# Patient Record
Sex: FEMALE | Race: WHITE | NOT HISPANIC OR LATINO | Employment: FULL TIME | URBAN - NONMETROPOLITAN AREA
[De-identification: names, ages, dates, MRNs, and addresses within clinical notes are randomized per-mention and may not be internally consistent; named-entity substitution may affect disease eponyms.]

---

## 2023-03-25 DIAGNOSIS — I10 ESSENTIAL (PRIMARY) HYPERTENSION: ICD-10-CM

## 2023-03-27 RX ORDER — DULOXETIN HYDROCHLORIDE 30 MG/1
1 CAPSULE, DELAYED RELEASE ORAL DAILY
COMMUNITY
Start: 2023-03-12

## 2023-03-27 RX ORDER — EPINEPHRINE 0.3 MG/.3ML
INJECTION SUBCUTANEOUS
COMMUNITY

## 2023-03-27 RX ORDER — LOSARTAN POTASSIUM 25 MG/1
1 TABLET ORAL DAILY
COMMUNITY
Start: 2023-03-02 | End: 2023-04-04 | Stop reason: ENTERED-IN-ERROR

## 2023-03-27 RX ORDER — ALBUTEROL SULFATE 90 UG/1
AEROSOL, METERED RESPIRATORY (INHALATION)
COMMUNITY

## 2023-03-27 RX ORDER — LOSARTAN POTASSIUM 25 MG/1
TABLET ORAL
Qty: 30 TABLET | Refills: 3 | Status: SHIPPED | OUTPATIENT
Start: 2023-03-27 | End: 2023-04-04 | Stop reason: SDUPTHER

## 2023-04-04 ENCOUNTER — OFFICE VISIT (OUTPATIENT)
Dept: PRIMARY CARE | Facility: CLINIC | Age: 48
End: 2023-04-04
Payer: COMMERCIAL

## 2023-04-04 VITALS
HEIGHT: 63 IN | WEIGHT: 199 LBS | SYSTOLIC BLOOD PRESSURE: 137 MMHG | HEART RATE: 98 BPM | BODY MASS INDEX: 35.26 KG/M2 | DIASTOLIC BLOOD PRESSURE: 97 MMHG

## 2023-04-04 DIAGNOSIS — E66.09 CLASS 2 OBESITY DUE TO EXCESS CALORIES WITHOUT SERIOUS COMORBIDITY WITH BODY MASS INDEX (BMI) OF 36.0 TO 36.9 IN ADULT: Primary | ICD-10-CM

## 2023-04-04 DIAGNOSIS — I10 ESSENTIAL (PRIMARY) HYPERTENSION: ICD-10-CM

## 2023-04-04 PROBLEM — R63.5 WEIGHT GAIN: Status: ACTIVE | Noted: 2023-04-04

## 2023-04-04 PROBLEM — E66.9 OBESITY: Status: ACTIVE | Noted: 2023-04-04

## 2023-04-04 PROBLEM — Z04.9 CONDITION NOT FOUND: Status: ACTIVE | Noted: 2023-04-04

## 2023-04-04 PROBLEM — E66.812 CLASS 2 OBESITY WITH BODY MASS INDEX (BMI) OF 36.0 TO 36.9 IN ADULT: Status: ACTIVE | Noted: 2023-04-04

## 2023-04-04 PROBLEM — E66.9 CLASS 2 OBESITY WITH BODY MASS INDEX (BMI) OF 36.0 TO 36.9 IN ADULT: Status: ACTIVE | Noted: 2023-04-04

## 2023-04-04 PROBLEM — M25.551 RIGHT HIP PAIN: Status: ACTIVE | Noted: 2023-04-04

## 2023-04-04 PROBLEM — R26.2 DIFFICULTY WALKING: Status: ACTIVE | Noted: 2023-04-04

## 2023-04-04 PROBLEM — G43.119 MIGRAINE WITH AURA, INTRACTABLE: Status: ACTIVE | Noted: 2023-04-04

## 2023-04-04 PROCEDURE — 99214 OFFICE O/P EST MOD 30 MIN: CPT | Performed by: REGISTERED NURSE

## 2023-04-04 PROCEDURE — 3075F SYST BP GE 130 - 139MM HG: CPT | Performed by: REGISTERED NURSE

## 2023-04-04 PROCEDURE — 1036F TOBACCO NON-USER: CPT | Performed by: REGISTERED NURSE

## 2023-04-04 PROCEDURE — 3080F DIAST BP >= 90 MM HG: CPT | Performed by: REGISTERED NURSE

## 2023-04-04 PROCEDURE — 3008F BODY MASS INDEX DOCD: CPT | Performed by: REGISTERED NURSE

## 2023-04-04 RX ORDER — LOSARTAN POTASSIUM 50 MG/1
50 TABLET ORAL DAILY
Qty: 30 TABLET | Refills: 1 | Status: SHIPPED | OUTPATIENT
Start: 2023-04-04 | End: 2023-04-26

## 2023-04-04 RX ORDER — PHENTERMINE HYDROCHLORIDE 37.5 MG/1
37.5 CAPSULE ORAL DAILY
COMMUNITY
End: 2023-05-10 | Stop reason: SINTOL

## 2023-04-04 ASSESSMENT — ENCOUNTER SYMPTOMS
SHORTNESS OF BREATH: 0
NAUSEA: 0
FEVER: 0
CONSTIPATION: 0
DIZZINESS: 0
ABDOMINAL PAIN: 0
CHILLS: 0
DIARRHEA: 0
CONFUSION: 0
NERVOUS/ANXIOUS: 0
DIFFICULTY URINATING: 0
COUGH: 0
HEADACHES: 0
WEAKNESS: 0
WOUND: 0
EYE REDNESS: 0
EYE DISCHARGE: 0
RHINORRHEA: 0
WHEEZING: 0
VOMITING: 0
FREQUENCY: 0

## 2023-04-26 DIAGNOSIS — I10 ESSENTIAL (PRIMARY) HYPERTENSION: ICD-10-CM

## 2023-04-26 PROBLEM — L50.9 HIVES: Status: ACTIVE | Noted: 2019-11-07

## 2023-04-26 PROBLEM — R06.02 SOB (SHORTNESS OF BREATH): Status: RESOLVED | Noted: 2019-11-07 | Resolved: 2023-04-26

## 2023-04-26 PROBLEM — Z91.010 PEANUT ALLERGY: Status: ACTIVE | Noted: 2019-11-21

## 2023-04-26 PROBLEM — R10.12 LUQ PAIN: Status: RESOLVED | Noted: 2018-08-23 | Resolved: 2023-04-26

## 2023-04-26 PROBLEM — T78.3XXA ANGIOEDEMA: Status: ACTIVE | Noted: 2019-11-07

## 2023-04-26 PROBLEM — R22.1 THROAT SWELLING: Status: RESOLVED | Noted: 2019-11-07 | Resolved: 2023-04-26

## 2023-04-26 PROBLEM — J30.89 ALLERGIC RHINITIS DUE TO DUST: Status: ACTIVE | Noted: 2019-11-21

## 2023-04-26 PROBLEM — Z96.651 S/P TOTAL KNEE ARTHROPLASTY, RIGHT: Status: ACTIVE | Noted: 2021-08-06

## 2023-04-26 PROBLEM — R13.10 DYSPHAGIA: Status: ACTIVE | Noted: 2018-08-23

## 2023-04-26 PROBLEM — R14.0 BLOATING: Status: ACTIVE | Noted: 2018-08-23

## 2023-04-26 PROBLEM — K57.30 DIVERTICULOSIS OF LARGE INTESTINE WITHOUT HEMORRHAGE: Status: RESOLVED | Noted: 2018-08-23 | Resolved: 2023-04-26

## 2023-04-26 PROBLEM — M25.561 ACUTE PAIN OF RIGHT KNEE: Status: RESOLVED | Noted: 2021-06-08 | Resolved: 2023-04-26

## 2023-04-26 PROBLEM — R06.83 SNORING: Status: ACTIVE | Noted: 2019-11-07

## 2023-04-26 PROBLEM — R19.4 CHANGE IN BOWEL HABITS: Status: ACTIVE | Noted: 2018-08-23

## 2023-04-26 PROBLEM — J30.1 SEASONAL ALLERGIC RHINITIS DUE TO POLLEN: Status: ACTIVE | Noted: 2019-11-21

## 2023-04-26 PROBLEM — J45.30 MILD PERSISTENT ASTHMA WITHOUT COMPLICATION (HHS-HCC): Status: ACTIVE | Noted: 2020-04-09

## 2023-04-26 PROBLEM — Z91.018 FOOD ALLERGY: Status: ACTIVE | Noted: 2019-11-07

## 2023-04-26 PROBLEM — K22.10 EROSIVE ESOPHAGITIS: Status: ACTIVE | Noted: 2018-08-23

## 2023-04-26 PROBLEM — K21.9 GERD (GASTROESOPHAGEAL REFLUX DISEASE): Status: ACTIVE | Noted: 2020-04-09

## 2023-04-26 PROBLEM — R12 PYROSIS: Status: ACTIVE | Noted: 2018-08-23

## 2023-04-26 PROBLEM — Z91.013 SHELLFISH ALLERGY: Status: ACTIVE | Noted: 2019-11-21

## 2023-04-26 RX ORDER — LOSARTAN POTASSIUM 50 MG/1
TABLET ORAL
Qty: 30 TABLET | Refills: 1 | Status: SHIPPED | OUTPATIENT
Start: 2023-04-26 | End: 2023-05-10 | Stop reason: SDUPTHER

## 2023-05-09 NOTE — PROGRESS NOTES
Subjective   Patient ID: Ese Edouard is a 47 y.o. female who presents for Follow-up (Pt presents for 1 month for wt loss; ozempic seems to be going okay, vomiting at times tho; losartan was increased last visit, unsure if its working out well or not, bp runs a big high).    HPI     Started phentermine last month. Feels like she is having opposite effects, feeling more hungry and tired. She was at 204 for starting weight. Today she is 199lbs. She is craving sugar. She is also on cymbalta. She changed her diet completely, quite drinking diet coke and drinking more water and eating fruit and veggies.   Feels stimulated for a couple hours and she then feels tied. Coffee does not do much for her.     Flank pain: 2 weeks ago, she ate dinner rice went to cardio drumming and threw up on the way home. Last night she had rice and drumming again and threw up. Urine is cloudy, does not feel like she has the flu. Having flank pain for a bout a week. Denies urgency. The pain is bad. Throbbing burning pain. She is taking ibuprofen for the pain. Constant flank pain radiating to her left side.      HTN: Her bp is still elevated,  Was started on losartan and increased last visit, 144/92.     Review of Systems   Constitutional:  Negative for chills and fever.   HENT:  Negative for congestion, ear pain and rhinorrhea.    Eyes:  Negative for discharge and redness.   Respiratory:  Negative for cough, shortness of breath and wheezing.    Cardiovascular:  Negative for chest pain and leg swelling.   Gastrointestinal:  Negative for abdominal pain, constipation, diarrhea, nausea and vomiting.   Genitourinary:  Negative for difficulty urinating, frequency and urgency.   Musculoskeletal:  Negative for gait problem.   Skin:  Negative for rash and wound.   Neurological:  Negative for dizziness, weakness and headaches.   Psychiatric/Behavioral:  Negative for confusion. The patient is not nervous/anxious.        Objective   BP (!) 141/94 (BP  "Location: Left arm, Patient Position: Sitting, BP Cuff Size: Large adult)   Pulse 80   Ht 1.575 m (5' 2\")   Wt 88.8 kg (195 lb 12.8 oz)   BMI 35.81 kg/m²     Physical Exam  Vitals reviewed.   Constitutional:       Appearance: Normal appearance.   HENT:      Head: Normocephalic.      Right Ear: Tympanic membrane, ear canal and external ear normal.      Left Ear: Tympanic membrane, ear canal and external ear normal.      Nose: No rhinorrhea.      Mouth/Throat:      Mouth: Mucous membranes are moist.      Pharynx: Oropharynx is clear.   Eyes:      Pupils: Pupils are equal, round, and reactive to light.   Cardiovascular:      Rate and Rhythm: Normal rate and regular rhythm.      Pulses: Normal pulses.   Pulmonary:      Effort: Pulmonary effort is normal.      Breath sounds: Normal breath sounds.   Abdominal:      General: Abdomen is flat. Bowel sounds are normal.      Palpations: Abdomen is soft.   Musculoskeletal:         General: No tenderness. Normal range of motion.      Right lower leg: No edema.      Left lower leg: No edema.   Lymphadenopathy:      Cervical: No cervical adenopathy.   Skin:     General: Skin is warm and dry.      Findings: No rash.   Neurological:      Mental Status: She is alert and oriented to person, place, and time.   Psychiatric:         Mood and Affect: Mood normal.         Behavior: Behavior normal.       Assessment/Plan       #Flank pain   Concerning for kidney stone   CT abdomen pelvis ordered   Cmp, uric acid   Also having great toe pain   Will call with results       #Difficulty with weight loss  Discussed wellbutrin, phentermine, and other meds  Had SE's on topiramate  Labs show some insulin resisitance  CSA signed today  Trial phentermine   Did not like phentermine   Trial Ozempic   May be a candidate for tulicity or ozempic if SE's on ozempic      #Htn  Increasing losartan today   "

## 2023-05-10 ENCOUNTER — LAB (OUTPATIENT)
Dept: LAB | Facility: LAB | Age: 48
End: 2023-05-10
Payer: COMMERCIAL

## 2023-05-10 ENCOUNTER — APPOINTMENT (OUTPATIENT)
Dept: PRIMARY CARE | Facility: CLINIC | Age: 48
End: 2023-05-10
Payer: COMMERCIAL

## 2023-05-10 ENCOUNTER — OFFICE VISIT (OUTPATIENT)
Dept: PRIMARY CARE | Facility: CLINIC | Age: 48
End: 2023-05-10
Payer: COMMERCIAL

## 2023-05-10 VITALS
SYSTOLIC BLOOD PRESSURE: 141 MMHG | HEART RATE: 80 BPM | BODY MASS INDEX: 36.03 KG/M2 | HEIGHT: 62 IN | DIASTOLIC BLOOD PRESSURE: 94 MMHG | WEIGHT: 195.8 LBS

## 2023-05-10 DIAGNOSIS — M79.676 PAIN OF GREAT TOE, UNSPECIFIED LATERALITY: ICD-10-CM

## 2023-05-10 DIAGNOSIS — R10.9 ACUTE LEFT FLANK PAIN: ICD-10-CM

## 2023-05-10 DIAGNOSIS — I10 ESSENTIAL (PRIMARY) HYPERTENSION: ICD-10-CM

## 2023-05-10 DIAGNOSIS — E66.09 CLASS 2 OBESITY DUE TO EXCESS CALORIES WITHOUT SERIOUS COMORBIDITY WITH BODY MASS INDEX (BMI) OF 36.0 TO 36.9 IN ADULT: Primary | ICD-10-CM

## 2023-05-10 LAB
ALANINE AMINOTRANSFERASE (SGPT) (U/L) IN SER/PLAS: 23 U/L (ref 7–45)
ALBUMIN (G/DL) IN SER/PLAS: 4.5 G/DL (ref 3.4–5)
ALKALINE PHOSPHATASE (U/L) IN SER/PLAS: 59 U/L (ref 33–110)
ANION GAP IN SER/PLAS: 12 MMOL/L (ref 10–20)
ASPARTATE AMINOTRANSFERASE (SGOT) (U/L) IN SER/PLAS: 24 U/L (ref 9–39)
BILIRUBIN TOTAL (MG/DL) IN SER/PLAS: 0.7 MG/DL (ref 0–1.2)
CALCIUM (MG/DL) IN SER/PLAS: 9.8 MG/DL (ref 8.6–10.3)
CARBON DIOXIDE, TOTAL (MMOL/L) IN SER/PLAS: 30 MMOL/L (ref 21–32)
CHLORIDE (MMOL/L) IN SER/PLAS: 103 MMOL/L (ref 98–107)
CREATININE (MG/DL) IN SER/PLAS: 0.81 MG/DL (ref 0.5–1.05)
GFR FEMALE: 89 ML/MIN/1.73M2
GLUCOSE (MG/DL) IN SER/PLAS: 89 MG/DL (ref 74–99)
POTASSIUM (MMOL/L) IN SER/PLAS: 3.8 MMOL/L (ref 3.5–5.3)
PROTEIN TOTAL: 7.1 G/DL (ref 6.4–8.2)
SODIUM (MMOL/L) IN SER/PLAS: 141 MMOL/L (ref 136–145)
URATE (MG/DL) IN SER/PLAS: 5.5 MG/DL (ref 2.3–6.7)
UREA NITROGEN (MG/DL) IN SER/PLAS: 8 MG/DL (ref 6–23)

## 2023-05-10 PROCEDURE — 3008F BODY MASS INDEX DOCD: CPT | Performed by: REGISTERED NURSE

## 2023-05-10 PROCEDURE — 84550 ASSAY OF BLOOD/URIC ACID: CPT

## 2023-05-10 PROCEDURE — 1036F TOBACCO NON-USER: CPT | Performed by: REGISTERED NURSE

## 2023-05-10 PROCEDURE — 3077F SYST BP >= 140 MM HG: CPT | Performed by: REGISTERED NURSE

## 2023-05-10 PROCEDURE — 80053 COMPREHEN METABOLIC PANEL: CPT

## 2023-05-10 PROCEDURE — 36415 COLL VENOUS BLD VENIPUNCTURE: CPT

## 2023-05-10 PROCEDURE — 99214 OFFICE O/P EST MOD 30 MIN: CPT | Performed by: REGISTERED NURSE

## 2023-05-10 PROCEDURE — 3080F DIAST BP >= 90 MM HG: CPT | Performed by: REGISTERED NURSE

## 2023-05-10 RX ORDER — LOSARTAN POTASSIUM 100 MG/1
100 TABLET ORAL DAILY
Qty: 30 TABLET | Refills: 1 | Status: SHIPPED | OUTPATIENT
Start: 2023-05-10 | End: 2023-06-02

## 2023-05-10 RX ORDER — LOSARTAN POTASSIUM 50 MG/1
50 TABLET ORAL DAILY
Qty: 90 TABLET | Refills: 3 | Status: SHIPPED | OUTPATIENT
Start: 2023-05-10 | End: 2023-07-28

## 2023-05-10 ASSESSMENT — ENCOUNTER SYMPTOMS
FEVER: 0
NERVOUS/ANXIOUS: 0
HEADACHES: 0
SHORTNESS OF BREATH: 0
COUGH: 0
CONSTIPATION: 0
CHILLS: 0
DIARRHEA: 0
WOUND: 0
FREQUENCY: 0
DIFFICULTY URINATING: 0
RHINORRHEA: 0
WEAKNESS: 0
ABDOMINAL PAIN: 0
WHEEZING: 0
NAUSEA: 0
CONFUSION: 0
VOMITING: 0
EYE DISCHARGE: 0
EYE REDNESS: 0
DIZZINESS: 0

## 2023-05-11 DIAGNOSIS — R07.81 RIB PAIN ON LEFT SIDE: Primary | ICD-10-CM

## 2023-05-11 RX ORDER — METHYLPREDNISOLONE 4 MG/1
TABLET ORAL
Qty: 21 TABLET | Refills: 0 | Status: SHIPPED | OUTPATIENT
Start: 2023-05-11 | End: 2023-05-18

## 2023-05-11 RX ORDER — CYCLOBENZAPRINE HCL 5 MG
5 TABLET ORAL 3 TIMES DAILY PRN
Qty: 30 TABLET | Refills: 0 | Status: SHIPPED | OUTPATIENT
Start: 2023-05-11 | End: 2023-07-10

## 2023-06-02 DIAGNOSIS — I10 ESSENTIAL (PRIMARY) HYPERTENSION: ICD-10-CM

## 2023-06-02 RX ORDER — LOSARTAN POTASSIUM 100 MG/1
TABLET ORAL
Qty: 30 TABLET | Refills: 1 | Status: SHIPPED | OUTPATIENT
Start: 2023-06-02 | End: 2023-06-27

## 2023-06-06 NOTE — PROGRESS NOTES
#Difficulty with weight loss  Discussed wellbutrin, phentermine, and other meds  Had SE's on topiramate  Labs show some insulin resisitance  CSA signed today  Trial phentermine   Did not like phentermine   Trial Ozempic   May be a candidate for trulicity or ozempic if SE's on ozempic     Crystal is here for an ozempic follow up. Her previous weight was   Wt Readings from Last 1 Encounters:   05/10/23 88.8 kg (195 lb 12.8 oz)

## 2023-06-09 ENCOUNTER — APPOINTMENT (OUTPATIENT)
Dept: PRIMARY CARE | Facility: CLINIC | Age: 48
End: 2023-06-09
Payer: COMMERCIAL

## 2023-06-26 DIAGNOSIS — I10 ESSENTIAL (PRIMARY) HYPERTENSION: ICD-10-CM

## 2023-06-27 RX ORDER — LOSARTAN POTASSIUM 100 MG/1
TABLET ORAL
Qty: 30 TABLET | Refills: 1 | Status: SHIPPED | OUTPATIENT
Start: 2023-06-27 | End: 2023-07-11

## 2023-07-03 DIAGNOSIS — E66.09 CLASS 2 OBESITY DUE TO EXCESS CALORIES WITHOUT SERIOUS COMORBIDITY WITH BODY MASS INDEX (BMI) OF 36.0 TO 36.9 IN ADULT: ICD-10-CM

## 2023-07-11 DIAGNOSIS — I10 ESSENTIAL (PRIMARY) HYPERTENSION: ICD-10-CM

## 2023-07-11 RX ORDER — LOSARTAN POTASSIUM 100 MG/1
TABLET ORAL
Qty: 90 TABLET | Refills: 1 | Status: SHIPPED | OUTPATIENT
Start: 2023-07-11 | End: 2024-01-09 | Stop reason: ALTCHOICE

## 2023-07-27 NOTE — PROGRESS NOTES
Subjective   Patient ID: Ese Edouard is a 47 y.o. female who presents for Follow-up (1 month; would like to discuss increasing the semaglutide; losartan was increased last visit, no SE, all good;).    HPI   Started phentermine last month. Feels like she is having opposite effects, feeling more hungry and tired. She was at 204 for starting weight. Today she is 199lbs. She is craving sugar. She is also on cymbalta. She changed her diet completely, quite drinking diet coke and drinking more water and eating fruit and veggies.   Feels stimulated for a couple hours and she then feels tied. Coffee does not do much for her.     Ran out of ozempic, missed her last appt. She does not feel it working as well.      Flank pain: 2 weeks ago, she ate dinner rice went to cardio drumming and threw up on the way home. Last night she had rice and drumming again and threw up. Urine is cloudy, does not feel like she has the flu. Having flank pain for a bout a week. Denies urgency. The pain is bad. Throbbing burning pain. She is taking ibuprofen for the pain. Constant flank pain radiating to her left side.      HTN: Her bp is still elevated,  Was started on losartan and increased last visit, 144/92.   She has been checking it at home and it has been fine.        Review of Systems   Constitutional:  Negative for chills and fever.   HENT:  Negative for congestion, ear pain and rhinorrhea.    Eyes:  Negative for discharge and redness.   Respiratory:  Negative for cough, shortness of breath and wheezing.    Cardiovascular:  Negative for chest pain and leg swelling.   Gastrointestinal:  Negative for abdominal pain, constipation, diarrhea, nausea and vomiting.   Genitourinary:  Negative for difficulty urinating, frequency and urgency.   Musculoskeletal:  Negative for gait problem.   Skin:  Negative for rash and wound.   Neurological:  Negative for dizziness, weakness and headaches.   Psychiatric/Behavioral:  Negative for confusion. The  "patient is not nervous/anxious.        Objective   BP (!) 138/109 (BP Location: Right arm, Patient Position: Sitting, BP Cuff Size: Large adult)   Pulse 66   Ht 1.6 m (5' 3\")   Wt 88.5 kg (195 lb 3.2 oz)   SpO2 96%   BMI 34.58 kg/m²     Physical Exam  Vitals reviewed.   Constitutional:       Appearance: Normal appearance.   HENT:      Head: Normocephalic.      Right Ear: Tympanic membrane, ear canal and external ear normal.      Left Ear: Tympanic membrane, ear canal and external ear normal.      Nose: No rhinorrhea.      Mouth/Throat:      Mouth: Mucous membranes are moist.      Pharynx: Oropharynx is clear.   Eyes:      Pupils: Pupils are equal, round, and reactive to light.   Cardiovascular:      Rate and Rhythm: Normal rate and regular rhythm.      Pulses: Normal pulses.   Pulmonary:      Effort: Pulmonary effort is normal.      Breath sounds: Normal breath sounds.   Abdominal:      General: Abdomen is flat. Bowel sounds are normal.      Palpations: Abdomen is soft.   Musculoskeletal:         General: No tenderness. Normal range of motion.      Right lower leg: No edema.      Left lower leg: No edema.   Lymphadenopathy:      Cervical: No cervical adenopathy.   Skin:     General: Skin is warm and dry.      Findings: No rash.   Neurological:      Mental Status: She is alert and oriented to person, place, and time.   Psychiatric:         Mood and Affect: Mood normal.         Behavior: Behavior normal.       Assessment/Plan          #Difficulty with weight loss  Discussed wellbutrin, phentermine, and other meds  Had SE's on topiramate  Labs show some insulin resisitance  Did not like phentermine   Trial Ozempic   Doing well   Increasing dose today      #Htn  Continue losartan   "

## 2023-07-28 ENCOUNTER — OFFICE VISIT (OUTPATIENT)
Dept: PRIMARY CARE | Facility: CLINIC | Age: 48
End: 2023-07-28
Payer: COMMERCIAL

## 2023-07-28 VITALS
HEART RATE: 66 BPM | BODY MASS INDEX: 34.59 KG/M2 | SYSTOLIC BLOOD PRESSURE: 138 MMHG | WEIGHT: 195.2 LBS | HEIGHT: 63 IN | DIASTOLIC BLOOD PRESSURE: 109 MMHG | OXYGEN SATURATION: 96 %

## 2023-07-28 DIAGNOSIS — E66.09 CLASS 2 OBESITY DUE TO EXCESS CALORIES WITHOUT SERIOUS COMORBIDITY WITH BODY MASS INDEX (BMI) OF 36.0 TO 36.9 IN ADULT: ICD-10-CM

## 2023-07-28 DIAGNOSIS — I10 PRIMARY HYPERTENSION: Primary | ICD-10-CM

## 2023-07-28 PROCEDURE — 3080F DIAST BP >= 90 MM HG: CPT | Performed by: REGISTERED NURSE

## 2023-07-28 PROCEDURE — 3008F BODY MASS INDEX DOCD: CPT | Performed by: REGISTERED NURSE

## 2023-07-28 PROCEDURE — 1036F TOBACCO NON-USER: CPT | Performed by: REGISTERED NURSE

## 2023-07-28 PROCEDURE — 3075F SYST BP GE 130 - 139MM HG: CPT | Performed by: REGISTERED NURSE

## 2023-07-28 PROCEDURE — 99213 OFFICE O/P EST LOW 20 MIN: CPT | Performed by: REGISTERED NURSE

## 2023-07-28 ASSESSMENT — ENCOUNTER SYMPTOMS
EYE DISCHARGE: 0
RHINORRHEA: 0
NERVOUS/ANXIOUS: 0
DIFFICULTY URINATING: 0
WOUND: 0
VOMITING: 0
CHILLS: 0
COUGH: 0
EYE REDNESS: 0
WHEEZING: 0
HEADACHES: 0
FREQUENCY: 0
NAUSEA: 0
DIZZINESS: 0
ABDOMINAL PAIN: 0
CONSTIPATION: 0
WEAKNESS: 0
DIARRHEA: 0
CONFUSION: 0
FEVER: 0
SHORTNESS OF BREATH: 0

## 2023-07-28 NOTE — PATIENT INSTRUCTIONS
Week 1 through week 4 : 0.25 mg once weekly.    Week 5 through week 8: 0.5 mg once weekly.    Week 9 through week 12: 1 mg once weekly.    Week 13 through week 16: 1.7 mg once weekly.    Week 17 and thereafter (maintenance dosage): 2.4 mg once weekly; if not tolerated, may temporarily decrease dosage to 1.7 mg once weekly for up to 4 additional weeks, then increase to 2.4 mg once weekly.

## 2023-09-01 DIAGNOSIS — E66.09 CLASS 2 OBESITY DUE TO EXCESS CALORIES WITHOUT SERIOUS COMORBIDITY WITH BODY MASS INDEX (BMI) OF 36.0 TO 36.9 IN ADULT: ICD-10-CM

## 2023-10-19 NOTE — PROGRESS NOTES
"Ese Edouard is a 47 y.o. female who presents for Follow-up (3 months; feeling very weak, heart palpitations, chest pain for about a week; declining flu shot)    Palpitations: She worked day shift for a week and she was very tired, almost falling asleep/nodding off when she sat down. Feeling very weak. Chest pains and palpitations that come and go. Heart is racing when she gets up and walks around. Chest pain she describes as a discomfort/squeezing. Mild shortness of breath. No nausea. One brief episode of heartburn. No fevers or chills. Minimal cough, no sore throat or rhinorrhea,. She is having \"adrenaline rushes\" that she feels in her wrists and neck.    Obesity: SE's on phentermine and topiramate. She is currently taking ozempic. No SE's, feels it is helping.     Nerve pain: On cymbalta.     HTN: On losartan, no SE's but BP is about the same as without medication.     Objective   BP (!) 148/96   Pulse 79   Ht 1.6 m (5' 3\")   Wt 84.8 kg (187 lb)   BMI 33.13 kg/m²     Gen: No acute distress, alert and oriented x3, pleasant   HEENT: moist mucous membranes, b/l external auditory canals are clear of debris, TMs within normal limits, no oropharyngeal lesions, eomi, perrla   Neck: thyroid within normal limits, no lymphadenopathy   CV: RRR, normal S1/S2, no murmur   Resp: Clear to auscultation bilaterally, no wheezes or rhonchi appreciated  Abd: soft, nontender, non-distended, no guarding/rigidity, bowel sounds present  Extr: no edema, no calf tenderness  Derm: Skin is warm and dry, no rashes appreciated  Psych: mood is good, affect is congruent, good hygiene, normal speech and eye contact  Neuro: cranial nerves grossly intact, normal gait    Assessment/Plan     #Palpitations:  Check labs, EKG, CXR  Ordering zio patch  Considering dropping down on ozempic dosage  Followup 1 mo    #Obesity  #Insulin resistance  Discussed wellbutrin, phentermine, and other meds  Had SE's on topiramate and phentermine  Labs show " some insulin resisitance  Doing well on ozempic    #Nerve pain  Reasonable control on cymbalta is wearing off  after finishing phentermine we may increase the dose     #Severe allergies  rx sent epi pen     #HTN  Reasonable control on losartan     HCM:  [facility]  Last period was April of 2022

## 2023-10-27 ENCOUNTER — HOSPITAL ENCOUNTER (OUTPATIENT)
Dept: CARDIOLOGY | Facility: HOSPITAL | Age: 48
Discharge: HOME | End: 2023-10-27
Payer: COMMERCIAL

## 2023-10-27 ENCOUNTER — HOSPITAL ENCOUNTER (OUTPATIENT)
Dept: RADIOLOGY | Facility: HOSPITAL | Age: 48
Discharge: HOME | End: 2023-10-27
Payer: COMMERCIAL

## 2023-10-27 ENCOUNTER — LAB (OUTPATIENT)
Dept: LAB | Facility: LAB | Age: 48
End: 2023-10-27
Payer: COMMERCIAL

## 2023-10-27 ENCOUNTER — OFFICE VISIT (OUTPATIENT)
Dept: PRIMARY CARE | Facility: CLINIC | Age: 48
End: 2023-10-27
Payer: COMMERCIAL

## 2023-10-27 VITALS
SYSTOLIC BLOOD PRESSURE: 148 MMHG | HEIGHT: 63 IN | WEIGHT: 187 LBS | HEART RATE: 79 BPM | BODY MASS INDEX: 33.13 KG/M2 | DIASTOLIC BLOOD PRESSURE: 96 MMHG

## 2023-10-27 DIAGNOSIS — R00.2 PALPITATIONS: ICD-10-CM

## 2023-10-27 DIAGNOSIS — Z13.220 SCREENING FOR HYPERLIPIDEMIA: ICD-10-CM

## 2023-10-27 DIAGNOSIS — E88.819 INSULIN RESISTANCE: ICD-10-CM

## 2023-10-27 DIAGNOSIS — E88.819 INSULIN RESISTANCE: Primary | ICD-10-CM

## 2023-10-27 LAB
ALBUMIN SERPL BCP-MCNC: 4.7 G/DL (ref 3.4–5)
ALP SERPL-CCNC: 57 U/L (ref 33–110)
ALT SERPL W P-5'-P-CCNC: 19 U/L (ref 7–45)
ANION GAP SERPL CALC-SCNC: 12 MMOL/L (ref 10–20)
APPEARANCE UR: CLEAR
AST SERPL W P-5'-P-CCNC: 16 U/L (ref 9–39)
BASOPHILS # BLD AUTO: 0.04 X10*3/UL (ref 0–0.1)
BASOPHILS NFR BLD AUTO: 0.5 %
BILIRUB SERPL-MCNC: 0.7 MG/DL (ref 0–1.2)
BILIRUB UR STRIP.AUTO-MCNC: NEGATIVE MG/DL
BUN SERPL-MCNC: 13 MG/DL (ref 6–23)
CALCIUM SERPL-MCNC: 9.4 MG/DL (ref 8.6–10.3)
CARDIAC TROPONIN I PNL SERPL HS: <3 NG/L (ref 0–13)
CHLORIDE SERPL-SCNC: 105 MMOL/L (ref 98–107)
CHOLEST SERPL-MCNC: 171 MG/DL (ref 0–199)
CHOLESTEROL/HDL RATIO: 2.9
CO2 SERPL-SCNC: 29 MMOL/L (ref 21–32)
COLOR UR: YELLOW
CREAT SERPL-MCNC: 0.7 MG/DL (ref 0.5–1.05)
EOSINOPHIL # BLD AUTO: 0.16 X10*3/UL (ref 0–0.7)
EOSINOPHIL NFR BLD AUTO: 2.1 %
ERYTHROCYTE [DISTWIDTH] IN BLOOD BY AUTOMATED COUNT: 12.5 % (ref 11.5–14.5)
GFR SERPL CREATININE-BSD FRML MDRD: >90 ML/MIN/1.73M*2
GLUCOSE SERPL-MCNC: 78 MG/DL (ref 74–99)
GLUCOSE UR STRIP.AUTO-MCNC: NEGATIVE MG/DL
HCT VFR BLD AUTO: 41.9 % (ref 36–46)
HDLC SERPL-MCNC: 58.9 MG/DL
HGB BLD-MCNC: 14.3 G/DL (ref 12–16)
IMM GRANULOCYTES # BLD AUTO: 0.02 X10*3/UL (ref 0–0.7)
IMM GRANULOCYTES NFR BLD AUTO: 0.3 % (ref 0–0.9)
KETONES UR STRIP.AUTO-MCNC: NEGATIVE MG/DL
LDLC SERPL CALC-MCNC: 93 MG/DL
LEUKOCYTE ESTERASE UR QL STRIP.AUTO: NEGATIVE
LYMPHOCYTES # BLD AUTO: 1.84 X10*3/UL (ref 1.2–4.8)
LYMPHOCYTES NFR BLD AUTO: 24.1 %
MCH RBC QN AUTO: 29.2 PG (ref 26–34)
MCHC RBC AUTO-ENTMCNC: 34.1 G/DL (ref 32–36)
MCV RBC AUTO: 86 FL (ref 80–100)
MONOCYTES # BLD AUTO: 0.58 X10*3/UL (ref 0.1–1)
MONOCYTES NFR BLD AUTO: 7.6 %
NEUTROPHILS # BLD AUTO: 4.98 X10*3/UL (ref 1.2–7.7)
NEUTROPHILS NFR BLD AUTO: 65.4 %
NITRITE UR QL STRIP.AUTO: NEGATIVE
NON HDL CHOLESTEROL: 112 MG/DL (ref 0–149)
NRBC BLD-RTO: 0 /100 WBCS (ref 0–0)
PH UR STRIP.AUTO: 6 [PH]
PLATELET # BLD AUTO: 236 X10*3/UL (ref 150–450)
PMV BLD AUTO: 10.3 FL (ref 7.5–11.5)
POTASSIUM SERPL-SCNC: 4 MMOL/L (ref 3.5–5.3)
PROT SERPL-MCNC: 7.1 G/DL (ref 6.4–8.2)
PROT UR STRIP.AUTO-MCNC: NEGATIVE MG/DL
RBC # BLD AUTO: 4.89 X10*6/UL (ref 4–5.2)
RBC # UR STRIP.AUTO: NEGATIVE /UL
SODIUM SERPL-SCNC: 142 MMOL/L (ref 136–145)
SP GR UR STRIP.AUTO: 1.02
TRIGL SERPL-MCNC: 95 MG/DL (ref 0–149)
TSH SERPL-ACNC: 1 MIU/L (ref 0.44–3.98)
UROBILINOGEN UR STRIP.AUTO-MCNC: <2 MG/DL
VLDL: 19 MG/DL (ref 0–40)
WBC # BLD AUTO: 7.6 X10*3/UL (ref 4.4–11.3)

## 2023-10-27 PROCEDURE — 80061 LIPID PANEL: CPT

## 2023-10-27 PROCEDURE — 36415 COLL VENOUS BLD VENIPUNCTURE: CPT

## 2023-10-27 PROCEDURE — 3080F DIAST BP >= 90 MM HG: CPT | Performed by: FAMILY MEDICINE

## 2023-10-27 PROCEDURE — 87086 URINE CULTURE/COLONY COUNT: CPT

## 2023-10-27 PROCEDURE — 82607 VITAMIN B-12: CPT

## 2023-10-27 PROCEDURE — 93010 ELECTROCARDIOGRAM REPORT: CPT | Performed by: INTERNAL MEDICINE

## 2023-10-27 PROCEDURE — 84484 ASSAY OF TROPONIN QUANT: CPT

## 2023-10-27 PROCEDURE — 93005 ELECTROCARDIOGRAM TRACING: CPT | Mod: 59

## 2023-10-27 PROCEDURE — 93246 EXT ECG>7D<15D RECORDING: CPT

## 2023-10-27 PROCEDURE — 71046 X-RAY EXAM CHEST 2 VIEWS: CPT | Performed by: RADIOLOGY

## 2023-10-27 PROCEDURE — 1036F TOBACCO NON-USER: CPT | Performed by: FAMILY MEDICINE

## 2023-10-27 PROCEDURE — 71046 X-RAY EXAM CHEST 2 VIEWS: CPT | Mod: FY

## 2023-10-27 PROCEDURE — 85025 COMPLETE CBC W/AUTO DIFF WBC: CPT

## 2023-10-27 PROCEDURE — 99214 OFFICE O/P EST MOD 30 MIN: CPT | Performed by: FAMILY MEDICINE

## 2023-10-27 PROCEDURE — 3008F BODY MASS INDEX DOCD: CPT | Performed by: FAMILY MEDICINE

## 2023-10-27 PROCEDURE — 3077F SYST BP >= 140 MM HG: CPT | Performed by: FAMILY MEDICINE

## 2023-10-27 PROCEDURE — 84443 ASSAY THYROID STIM HORMONE: CPT

## 2023-10-27 PROCEDURE — 83036 HEMOGLOBIN GLYCOSYLATED A1C: CPT

## 2023-10-27 PROCEDURE — 80053 COMPREHEN METABOLIC PANEL: CPT

## 2023-10-27 PROCEDURE — 81003 URINALYSIS AUTO W/O SCOPE: CPT

## 2023-10-28 LAB
EST. AVERAGE GLUCOSE BLD GHB EST-MCNC: 97 MG/DL
HBA1C MFR BLD: 5 %
VIT B12 SERPL-MCNC: 415 PG/ML (ref 211–911)

## 2023-10-29 LAB — BACTERIA UR CULT: NORMAL

## 2023-11-02 LAB
ATRIAL RATE: 72 BPM
P AXIS: 59 DEGREES
P OFFSET: 201 MS
P ONSET: 144 MS
PR INTERVAL: 160 MS
Q ONSET: 224 MS
QRS COUNT: 12 BEATS
QRS DURATION: 86 MS
QT INTERVAL: 392 MS
QTC CALCULATION(BAZETT): 429 MS
QTC FREDERICIA: 416 MS
R AXIS: 52 DEGREES
T AXIS: 16 DEGREES
T OFFSET: 420 MS
VENTRICULAR RATE: 72 BPM

## 2023-11-21 PROBLEM — E66.812 CLASS 2 OBESITY WITH BODY MASS INDEX (BMI) OF 36.0 TO 36.9 IN ADULT: Status: RESOLVED | Noted: 2023-04-04 | Resolved: 2023-11-21

## 2023-11-21 PROBLEM — R19.4 CHANGE IN BOWEL HABITS: Status: RESOLVED | Noted: 2018-08-23 | Resolved: 2023-11-21

## 2023-11-21 PROBLEM — M25.551 RIGHT HIP PAIN: Status: RESOLVED | Noted: 2023-04-04 | Resolved: 2023-11-21

## 2023-11-21 PROBLEM — Z91.018 FOOD ALLERGY: Status: RESOLVED | Noted: 2019-11-07 | Resolved: 2023-11-21

## 2023-11-21 PROBLEM — Z96.651 S/P TOTAL KNEE ARTHROPLASTY, RIGHT: Status: RESOLVED | Noted: 2021-08-06 | Resolved: 2023-11-21

## 2023-11-21 PROBLEM — R13.10 DYSPHAGIA: Status: RESOLVED | Noted: 2018-08-23 | Resolved: 2023-11-21

## 2023-11-21 PROBLEM — Z04.9 CONDITION NOT FOUND: Status: RESOLVED | Noted: 2023-04-04 | Resolved: 2023-11-21

## 2023-11-21 PROBLEM — E66.9 CLASS 2 OBESITY WITH BODY MASS INDEX (BMI) OF 36.0 TO 36.9 IN ADULT: Status: RESOLVED | Noted: 2023-04-04 | Resolved: 2023-11-21

## 2023-11-21 PROBLEM — R26.2 DIFFICULTY WALKING: Status: RESOLVED | Noted: 2023-04-04 | Resolved: 2023-11-21

## 2023-11-21 PROBLEM — J30.89 ALLERGIC RHINITIS DUE TO DUST: Status: RESOLVED | Noted: 2019-11-21 | Resolved: 2023-11-21

## 2023-11-21 PROBLEM — J30.1 SEASONAL ALLERGIC RHINITIS DUE TO POLLEN: Status: RESOLVED | Noted: 2019-11-21 | Resolved: 2023-11-21

## 2023-11-21 PROBLEM — Z91.013 SHELLFISH ALLERGY: Status: RESOLVED | Noted: 2019-11-21 | Resolved: 2023-11-21

## 2023-11-21 PROBLEM — Z91.010 PEANUT ALLERGY: Status: RESOLVED | Noted: 2019-11-21 | Resolved: 2023-11-21

## 2023-11-21 PROBLEM — T78.3XXA ANGIOEDEMA: Status: RESOLVED | Noted: 2019-11-07 | Resolved: 2023-11-21

## 2023-11-21 PROBLEM — R14.0 BLOATING: Status: RESOLVED | Noted: 2018-08-23 | Resolved: 2023-11-21

## 2023-11-21 PROBLEM — R12 PYROSIS: Status: RESOLVED | Noted: 2018-08-23 | Resolved: 2023-11-21

## 2023-11-21 PROBLEM — L50.9 HIVES: Status: RESOLVED | Noted: 2019-11-07 | Resolved: 2023-11-21

## 2023-12-08 LAB — BODY SURFACE AREA: 1.94 M2

## 2023-12-08 PROCEDURE — 93248 EXT ECG>7D<15D REV&INTERPJ: CPT | Performed by: INTERNAL MEDICINE

## 2023-12-13 DIAGNOSIS — E66.09 CLASS 2 OBESITY DUE TO EXCESS CALORIES WITHOUT SERIOUS COMORBIDITY WITH BODY MASS INDEX (BMI) OF 36.0 TO 36.9 IN ADULT: ICD-10-CM

## 2023-12-13 RX ORDER — SEMAGLUTIDE 0.68 MG/ML
0.25 INJECTION, SOLUTION SUBCUTANEOUS
Qty: 3 ML | Refills: 1 | Status: SHIPPED | OUTPATIENT
Start: 2023-12-13 | End: 2024-01-09 | Stop reason: ALTCHOICE

## 2023-12-18 ENCOUNTER — TELEPHONE (OUTPATIENT)
Dept: PRIMARY CARE | Facility: CLINIC | Age: 48
End: 2023-12-18
Payer: COMMERCIAL

## 2023-12-18 NOTE — TELEPHONE ENCOUNTER
Resume previous meds and check her blood pressure for 2 weeks and then drop off readings. Thank you.

## 2023-12-18 NOTE — TELEPHONE ENCOUNTER
Pt states she was in ED over weekend for high BP. Today it was 176/118. She just got back on her BP meds today after not taking them for 3 weeks. Should she come in. Or what would you like her to do?

## 2023-12-28 ENCOUNTER — TELEPHONE (OUTPATIENT)
Dept: PRIMARY CARE | Facility: CLINIC | Age: 48
End: 2023-12-28
Payer: COMMERCIAL

## 2023-12-28 NOTE — TELEPHONE ENCOUNTER
This patient no showed her last appointment and also called recently about her blood pressure being dangerously high, and then was recommended to resume previous meds and drop off 2 weeks of blood pressure readings. Give her a 6 week followup, tell her to stay on her meds consistently, and again bring in the readings to that appointment. I believe the fatigue will be gone by then if she lets her body gets used to the medication but if not I will make a change at that visit.

## 2023-12-28 NOTE — TELEPHONE ENCOUNTER
Pt states the BP medication she is taking is causing her to fall asleep all the time and just be tired all the time. What should she do?

## 2024-01-05 NOTE — PROGRESS NOTES
Nursing Note:  - BP prob    Ese Edouard is a 48 y.o. female who presents for No chief complaint on file.     Obesity: SE's on phentermine and topiramate. She is currently taking ozempic. No SE's, feels it is helping. Insurance stopped covering.      Nerve pain: On cymbalta.     HTN, palpitations: Had leg swelling on amlodipine. Had fatigue on metoprolol and losartan, couldn't function. Was having palpitations that resolved when she ran out of losartan as well. Some HA when she has elevated BP readings. Completed cardiac testing. Wants to see a cardiologist.     Objective   There were no vitals taken for this visit.    Gen: No acute distress, alert and oriented x3, pleasant   HEENT: moist mucous membranes, b/l external auditory canals are clear of debris, TMs within normal limits, no oropharyngeal lesions, eomi, perrla   Neck: thyroid within normal limits, no lymphadenopathy   CV: RRR, normal S1/S2, no murmur   Resp: Clear to auscultation bilaterally, no wheezes or rhonchi appreciated  Abd: soft, nontender, non-distended, no guarding/rigidity, bowel sounds present  Extr: no edema, no calf tenderness  Derm: Skin is warm and dry, no rashes appreciated  Psych: mood is good, affect is congruent, good hygiene, normal speech and eye contact  Neuro: cranial nerves grossly intact, normal gait    Assessment/Plan     #Palpitations:  Labs, EKG, CXR, and zio patch were normal     #Obesity  #Insulin resistance  Discussed wellbutrin, phentermine, and other meds  Had SE's on topiramate and phentermine  Labs show some insulin resisitance  Doing well on ozempic     #Nerve pain  Reasonable control on cymbalta is wearing off  after finishing phentermine we may increase the dose     #Severe allergies  rx sent epi pen     #HTN  SE's on amlodipine, hydrochlorothiazide, and losartan  Trial carvedilol  Referring to cardiology today     HCM:  Following with [facility]  Last period was April of 2022

## 2024-01-09 ENCOUNTER — OFFICE VISIT (OUTPATIENT)
Dept: PRIMARY CARE | Facility: CLINIC | Age: 49
End: 2024-01-09
Payer: COMMERCIAL

## 2024-01-09 DIAGNOSIS — I10 PRIMARY HYPERTENSION: Primary | ICD-10-CM

## 2024-01-09 PROCEDURE — 3008F BODY MASS INDEX DOCD: CPT | Performed by: FAMILY MEDICINE

## 2024-01-09 PROCEDURE — 99214 OFFICE O/P EST MOD 30 MIN: CPT | Performed by: FAMILY MEDICINE

## 2024-01-09 PROCEDURE — 1036F TOBACCO NON-USER: CPT | Performed by: FAMILY MEDICINE

## 2024-01-09 RX ORDER — CARVEDILOL 6.25 MG/1
6.25 TABLET ORAL
Qty: 60 TABLET | Refills: 11 | Status: SHIPPED | OUTPATIENT
Start: 2024-01-09 | End: 2024-04-15 | Stop reason: SDUPTHER

## 2024-01-22 ENCOUNTER — APPOINTMENT (OUTPATIENT)
Dept: LAB | Facility: LAB | Age: 49
End: 2024-01-22
Payer: COMMERCIAL

## 2024-01-22 ENCOUNTER — OFFICE VISIT (OUTPATIENT)
Dept: CARDIOLOGY | Facility: CLINIC | Age: 49
End: 2024-01-22
Payer: COMMERCIAL

## 2024-01-22 VITALS
DIASTOLIC BLOOD PRESSURE: 115 MMHG | BODY MASS INDEX: 36.32 KG/M2 | WEIGHT: 205.03 LBS | SYSTOLIC BLOOD PRESSURE: 188 MMHG | HEART RATE: 88 BPM | OXYGEN SATURATION: 97 %

## 2024-01-22 DIAGNOSIS — I10 PRIMARY HYPERTENSION: ICD-10-CM

## 2024-01-22 DIAGNOSIS — R29.818 SUSPECTED SLEEP APNEA: Primary | ICD-10-CM

## 2024-01-22 DIAGNOSIS — E66.01 CLASS 2 SEVERE OBESITY DUE TO EXCESS CALORIES WITH SERIOUS COMORBIDITY AND BODY MASS INDEX (BMI) OF 36.0 TO 36.9 IN ADULT (MULTI): ICD-10-CM

## 2024-01-22 DIAGNOSIS — I10 UNCONTROLLED HYPERTENSION: ICD-10-CM

## 2024-01-22 PROCEDURE — 3080F DIAST BP >= 90 MM HG: CPT | Performed by: NURSE PRACTITIONER

## 2024-01-22 PROCEDURE — 3077F SYST BP >= 140 MM HG: CPT | Performed by: NURSE PRACTITIONER

## 2024-01-22 PROCEDURE — 99204 OFFICE O/P NEW MOD 45 MIN: CPT | Performed by: NURSE PRACTITIONER

## 2024-01-22 PROCEDURE — 1036F TOBACCO NON-USER: CPT | Performed by: NURSE PRACTITIONER

## 2024-01-22 PROCEDURE — 3008F BODY MASS INDEX DOCD: CPT | Performed by: NURSE PRACTITIONER

## 2024-01-22 RX ORDER — LISINOPRIL 5 MG/1
5 TABLET ORAL DAILY
Qty: 30 TABLET | Refills: 11 | Status: SHIPPED | OUTPATIENT
Start: 2024-01-22 | End: 2024-04-15 | Stop reason: SDUPTHER

## 2024-01-22 NOTE — LETTER
January 22, 2024     Diana Foreman DO  [address]  [place] [address]    Patient: Ese Edouard   YOB: 1975   Date of Visit: 1/22/2024       Dear Dr. Diana Foreman DO:    Thank you for referring Ese Edouard to me for evaluation. Below are my notes for this consultation.  If you have questions, please do not hesitate to call me. I look forward to following your patient along with you.       Sincerely,     Silvia Avila, APRN-CNP      CC: No Recipients  ______________________________________________________________________________________    Primary Care Physician: Diana Foreman DO  Primary Cardiologist:      Date of Visit: 01/22/2024  2:20 PM EST  Location of visit:  [facility]   Type of Visit: New Patient        Chief Complaint   Patient presents with   • New Patient Visit     F/U ER visit Dec 2023  C/o palpitations and uncontrolled HTN.   Review Monitor       HPI / Summary:   Ese Edouard is a 48 y.o. female who presents to establish cardiac care    Past medical history significant for HTN, obesity (BMI 36)      She has been diagnosed with HTN for a couple years. Hx mitral valve prolapse age 19   Possible TIA 10 years ago but not sure   Dyspneic on exercise, admits she is deconditioned   Palpitations started in December 2023  Hydrochlorothiazide made her feel weak   Side effects on amlodipine = swelling   Losartan non-compliant then felt better with fewer palpitations, switched to carvedilol 2 weeks ago   An extended Holter did not correlate palpitations with tachycardia or arrhythmia     She has a lot of Daytime sleepiness    at [employer] working rotating shift      12 system review is negative except as noted above      FH: grandfather hx AF  SOC: denies tobacco, ETOH or drugs   3 normal pregnancies, through menopause             Medical History:   Past Medical History:   Diagnosis Date   • Abnormal ECG    • Acute pain of right knee 06/08/2021   • Asthma    •  Contusion of left forearm 09/16/2015   • Diverticulosis of large intestine without hemorrhage 08/23/2018   • Hypertension    • LUQ pain 08/23/2018   • Other intervertebral disc displacement, lumbar region 09/14/2016    Lumbar disc herniation   • Personal history of other diseases of the circulatory system 04/16/2014    History of hypertension   • Personal history of other diseases of the musculoskeletal system and connective tissue 04/16/2014    Personal history of fibromyalgia   • SOB (shortness of breath) 11/07/2019   • Throat swelling 11/07/2019       Surgical History:   Past Surgical History:   Procedure Laterality Date   • BACK SURGERY  09/14/2016    Lower Back Surgery   • ELBOW SURGERY  09/14/2016    Elbow Surgery   • MR HEAD ANGIO WO IV CONTRAST  1/1/2014    MR HEAD ANGIO WO IV CONTRAST 1/1/2014 [facility]INICAL LEGAC[facility]   • MR NECK ANGIO WO IV CONTRAST  1/1/2014    MR NECK ANGIO WO IV CONTRAST 1/1/2014 [facility] CLINICAL LEGACY       Family History:   Family History   Problem Relation Name Age of Onset   • Migraines Mother Jose G Gayle    • Other (hypertension, benign) Mother Jose G Gayle    • Diabetes type II Mother Jose G Gayle    • Diabetes Father Tatyana Edouard    • Diabetes type II Father Tatyana Edouard    • Atrial fibrillation Maternal Grandfather Bernard Clay    • Asthma Maternal Grandmother Audrey Clay    • Thyroid disease Maternal Grandmother Audrey Clay        Social History:   Tobacco Use: Low Risk  (1/22/2024)    Patient History    • Smoking Tobacco Use: Never    • Smokeless Tobacco Use: Never    • Passive Exposure: Never             MEDICATIONS:   Current Outpatient Medications   Medication Instructions   • albuterol 90 mcg/actuation inhaler inhale 1 to 2 puffs by mouth every 4 to 6 hours as needed   • carvedilol (COREG) 6.25 mg, oral, 2 times daily with meals   • DULoxetine (Cymbalta) 30 mg DR capsule 1 capsule, oral, Daily   • EPINEPHrine 0.3 mg/0.3 mL injection syringe USE AS DIRECTED FOR  "BEE STING   • lisinopril 5 mg, oral, Daily         IMAGING REVIEWED:   Holter monitor full report reviewed     LABS:  CBC:   Lab Results   Component Value Date    WBC 7.6 10/27/2023    RBC 4.89 10/27/2023    HGB 14.3 10/27/2023    HCT 41.9 10/27/2023    MCV 86 10/27/2023    MCH 29.2 10/27/2023    MCHC 34.1 10/27/2023    RDW 12.5 10/27/2023     10/27/2023    MPV 10.3 10/27/2023     CBC with Differential:    Lab Results   Component Value Date    WBC 7.6 10/27/2023    RBC 4.89 10/27/2023    HGB 14.3 10/27/2023    HCT 41.9 10/27/2023     10/27/2023    MCV 86 10/27/2023    MCH 29.2 10/27/2023    MCHC 34.1 10/27/2023    RDW 12.5 10/27/2023    NRBC 0.0 10/27/2023    LYMPHOPCT 24.1 10/27/2023    MONOPCT 7.6 10/27/2023    EOSPCT 2.1 10/27/2023    BASOPCT 0.5 10/27/2023    MONOSABS 0.58 10/27/2023    LYMPHSABS 1.84 10/27/2023    EOSABS 0.16 10/27/2023    BASOSABS 0.04 10/27/2023     CMP:    Lab Results   Component Value Date     10/27/2023    K 4.0 10/27/2023     10/27/2023    CO2 29 10/27/2023    BUN 13 10/27/2023    CREATININE 0.70 10/27/2023    GLUCOSE 78 10/27/2023    PROT 7.1 10/27/2023    CALCIUM 9.4 10/27/2023    BILITOT 0.7 10/27/2023    ALKPHOS 57 10/27/2023    AST 16 10/27/2023    ALT 19 10/27/2023     BMP:    Lab Results   Component Value Date     10/27/2023    K 4.0 10/27/2023     10/27/2023    CO2 29 10/27/2023    BUN 13 10/27/2023    CREATININE 0.70 10/27/2023    CALCIUM 9.4 10/27/2023    GLUCOSE 78 10/27/2023     Magnesium:No results found for: \"MG\"  Troponin:    Lab Results   Component Value Date    TROPHS <3 10/27/2023     BNP: No results found for: \"BNP\"    Lipid Panel:  Lab Results   Component Value Date    HDL 58.9 10/27/2023    CHHDL 2.9 10/27/2023    VLDL 19 10/27/2023    TRIG 95 10/27/2023    NHDL 112 10/27/2023        Lab work and imaging results independently reviewed by me         Visit Vitals  BP (!) 188/115   Pulse 88   Wt 93 kg (205 lb 0.4 oz)   SpO2 97%   BMI " 36.32 kg/m²   Smoking Status Never   BSA 2.03 m²          ECG dated 10/27/2026 independently reviewed   NSR       Constitutional:       Appearance: Healthy appearance. Not in distress.   Eyes:      Conjunctiva/sclera: Conjunctivae normal.   Neck:      Vascular: JVD normal.   Pulmonary:      Effort: Pulmonary effort is normal.      Breath sounds: Normal breath sounds.   Cardiovascular:      PMI at left midclavicular line. Normal rate. Regular rhythm. Normal S1. Normal S2.       Murmurs: There is no murmur.      No rub.   Pulses:     Intact distal pulses.   Edema:     Peripheral edema absent.   Abdominal:      General: Bowel sounds are normal.   Musculoskeletal:      Cervical back: Neck supple. Skin:     General: Skin is warm and dry.   Neurological:      Mental Status: Alert and oriented to person, place and time.               Problem List Items Addressed This Visit             ICD-10-CM    Uncontrolled hypertension I10    Relevant Medications    lisinopril 5 mg tablet    Other Relevant Orders    Aldosterone/Renin Activity Ratio    Obesity E66.9    Suspected sleep apnea - Primary R29.818    Relevant Orders    Referral to Adult Sleep Medicine       HTN with intolerance to several agents (hydrochlorothiazide, amlodipine, losartan)   Currently tolerating carvedilol   Add lisinopril -- she does not think she has taken in the past   Renin/ aldosterone level with hypokalemia    Suspected VINCE, referral to sleep medicine       01/22/24 at 3:00 PM - SANTOSH Dodson        Followup Appts:  Future Appointments   Date Time Provider Department Center   1/22/2024  3:10 PM [facility]   3/11/2024  2:20 PM SANTOSH Dodson [id number]

## 2024-01-22 NOTE — PROGRESS NOTES
Primary Care Physician: Diana Foreman DO  Primary Cardiologist:      Date of Visit: 01/22/2024  2:20 PM EST  Location of visit:  [facility]   Type of Visit: New Patient        Chief Complaint   Patient presents with    New Patient Visit     F/U ER visit Dec 2023  C/o palpitations and uncontrolled HTN.   Review Monitor       HPI / Summary:   Ese Edouard is a 48 y.o. female who presents to establish cardiac care    Past medical history significant for HTN, obesity (BMI 36)      She has been diagnosed with HTN for a couple years. Hx mitral valve prolapse age 19   Possible TIA 10 years ago but not sure   Dyspneic on exercise, admits she is deconditioned   Palpitations started in December 2023  Hydrochlorothiazide made her feel weak   Side effects on amlodipine = swelling   Losartan non-compliant then felt better with fewer palpitations, switched to carvedilol 2 weeks ago   An extended Holter did not correlate palpitations with tachycardia or arrhythmia     She has a lot of Daytime sleepiness    at [employer] working rotating shift      12 system review is negative except as noted above      FH: grandfather hx AF  SOC: denies tobacco, ETOH or drugs   3 normal pregnancies, through menopause             Medical History:   Past Medical History:   Diagnosis Date    Abnormal ECG     Acute pain of right knee 06/08/2021    Asthma     Contusion of left forearm 09/16/2015    Diverticulosis of large intestine without hemorrhage 08/23/2018    Hypertension     LUQ pain 08/23/2018    Other intervertebral disc displacement, lumbar region 09/14/2016    Lumbar disc herniation    Personal history of other diseases of the circulatory system 04/16/2014    History of hypertension    Personal history of other diseases of the musculoskeletal system and connective tissue 04/16/2014    Personal history of fibromyalgia    SOB (shortness of breath) 11/07/2019    Throat swelling 11/07/2019       Surgical History:   Past Surgical  History:   Procedure Laterality Date    BACK SURGERY  09/14/2016    Lower Back Surgery    ELBOW SURGERY  09/14/2016    Elbow Surgery    MR HEAD ANGIO WO IV CONTRAST  1/1/2014    MR HEAD ANGIO WO IV CONTRAST 1/1/2014 [facility]ICAL [facility]GAC[facility]    MR NECK ANGIO WO IV CONTRAST  1/1/2014    MR NECK ANGIO WO IV CONTRAST 1/1/2014 [facility] CLINICAL LEGAC[facility]       Family History:   Family History   Problem Relation Name Age of Onset    Migraines Mother Jose G Gayle     Other (hypertension, benign) Mother Jose G Gayle     Diabetes type II Mother Jose G Gayle     Diabetes Father Tatyana Edouard     Diabetes type II Father Tatyana Edouard     Atrial fibrillation Maternal Grandfather Bernard Clay     Asthma Maternal Grandmother Audrey Clay     Thyroid disease Maternal Grandmother Audrey Clay        Social History:   Tobacco Use: Low Risk  (1/22/2024)    Patient History     Smoking Tobacco Use: Never     Smokeless Tobacco Use: Never     Passive Exposure: Never             MEDICATIONS:   Current Outpatient Medications   Medication Instructions    albuterol 90 mcg/actuation inhaler inhale 1 to 2 puffs by mouth every 4 to 6 hours as needed    carvedilol (COREG) 6.25 mg, oral, 2 times daily with meals    DULoxetine (Cymbalta) 30 mg DR capsule 1 capsule, oral, Daily    EPINEPHrine 0.3 mg/0.3 mL injection syringe USE AS DIRECTED FOR BEE STING    lisinopril 5 mg, oral, Daily         IMAGING REVIEWED:   Holter monitor full report reviewed     LABS:  CBC:   Lab Results   Component Value Date    WBC 7.6 10/27/2023    RBC 4.89 10/27/2023    HGB 14.3 10/27/2023    HCT 41.9 10/27/2023    MCV 86 10/27/2023    MCH 29.2 10/27/2023    MCHC 34.1 10/27/2023    RDW 12.5 10/27/2023     10/27/2023    MPV 10.3 10/27/2023     CBC with Differential:    Lab Results   Component Value Date    WBC 7.6 10/27/2023    RBC 4.89 10/27/2023    HGB 14.3 10/27/2023    HCT 41.9 10/27/2023     10/27/2023    MCV 86 10/27/2023    MCH 29.2 10/27/2023    MCHC  "34.1 10/27/2023    RDW 12.5 10/27/2023    NRBC 0.0 10/27/2023    LYMPHOPCT 24.1 10/27/2023    MONOPCT 7.6 10/27/2023    EOSPCT 2.1 10/27/2023    BASOPCT 0.5 10/27/2023    MONOSABS 0.58 10/27/2023    LYMPHSABS 1.84 10/27/2023    EOSABS 0.16 10/27/2023    BASOSABS 0.04 10/27/2023     CMP:    Lab Results   Component Value Date     10/27/2023    K 4.0 10/27/2023     10/27/2023    CO2 29 10/27/2023    BUN 13 10/27/2023    CREATININE 0.70 10/27/2023    GLUCOSE 78 10/27/2023    PROT 7.1 10/27/2023    CALCIUM 9.4 10/27/2023    BILITOT 0.7 10/27/2023    ALKPHOS 57 10/27/2023    AST 16 10/27/2023    ALT 19 10/27/2023     BMP:    Lab Results   Component Value Date     10/27/2023    K 4.0 10/27/2023     10/27/2023    CO2 29 10/27/2023    BUN 13 10/27/2023    CREATININE 0.70 10/27/2023    CALCIUM 9.4 10/27/2023    GLUCOSE 78 10/27/2023     Magnesium:No results found for: \"MG\"  Troponin:    Lab Results   Component Value Date    TROPHS <3 10/27/2023     BNP: No results found for: \"BNP\"    Lipid Panel:  Lab Results   Component Value Date    HDL 58.9 10/27/2023    CHHDL 2.9 10/27/2023    VLDL 19 10/27/2023    TRIG 95 10/27/2023    NHDL 112 10/27/2023        Lab work and imaging results independently reviewed by me         Visit Vitals  BP (!) 188/115   Pulse 88   Wt 93 kg (205 lb 0.4 oz)   SpO2 97%   BMI 36.32 kg/m²   Smoking Status Never   BSA 2.03 m²          ECG dated 10/27/2026 independently reviewed   NSR       Constitutional:       Appearance: Healthy appearance. Not in distress.   Eyes:      Conjunctiva/sclera: Conjunctivae normal.   Neck:      Vascular: JVD normal.   Pulmonary:      Effort: Pulmonary effort is normal.      Breath sounds: Normal breath sounds.   Cardiovascular:      PMI at left midclavicular line. Normal rate. Regular rhythm. Normal S1. Normal S2.       Murmurs: There is no murmur.      No rub.   Pulses:     Intact distal pulses.   Edema:     Peripheral edema absent.   Abdominal:      " General: Bowel sounds are normal.   Musculoskeletal:      Cervical back: Neck supple. Skin:     General: Skin is warm and dry.   Neurological:      Mental Status: Alert and oriented to person, place and time.               Problem List Items Addressed This Visit             ICD-10-CM    Uncontrolled hypertension I10    Relevant Medications    lisinopril 5 mg tablet    Other Relevant Orders    Aldosterone/Renin Activity Ratio    Obesity E66.9    Suspected sleep apnea - Primary R29.818    Relevant Orders    Referral to Adult Sleep Medicine       HTN with intolerance to several agents (hydrochlorothiazide, amlodipine, losartan)   Currently tolerating carvedilol   Add lisinopril -- she does not think she has taken in the past   Renin/ aldosterone level with hypokalemia    Suspected VINCE, referral to sleep medicine       01/22/24 at 3:00 PM - SANTOSH Dodson        Followup Appts:  Future Appointments   Date Time Provider Department [place]   1/22/2024  3:10 PM [facility]   3/11/2024  2:20 PM SANTOSH Dodson [id number] [facility]

## 2024-01-22 NOTE — PATIENT INSTRUCTIONS
F/U 8 weeks     Home BP monitoring instructions:::: Goal < 130 / 80   Remain still, Avoid smoking, caffeinated beverages, or exercise within 30 min before BP measurements.  Ensure 5 min of quiet rest before BP measurements.  Sit correctly with back straight and supported (on a straight-backed dining chair, for example, rather than a sofa).  Sit with feet flat on the floor and legs uncrossed.  Keep arm supported on a flat surface (such as a table), with the upper arm at heart level.  Bottom of the cuff should be placed directly above the bend of the elbow  Take a reading in the AM before breakfast 2-3 times / week   Record all readings accurately:  A written log should be brought to all appointments   Monitors with built-in memory should be brought to all clinic appointments and calibrated to our machines

## 2024-02-06 ENCOUNTER — OFFICE VISIT (OUTPATIENT)
Dept: SLEEP MEDICINE | Facility: CLINIC | Age: 49
End: 2024-02-06
Payer: COMMERCIAL

## 2024-02-06 VITALS
BODY MASS INDEX: 35.76 KG/M2 | HEART RATE: 77 BPM | OXYGEN SATURATION: 96 % | WEIGHT: 201.9 LBS | DIASTOLIC BLOOD PRESSURE: 102 MMHG | SYSTOLIC BLOOD PRESSURE: 162 MMHG

## 2024-02-06 DIAGNOSIS — Z72.821 INADEQUATE SLEEP HYGIENE: ICD-10-CM

## 2024-02-06 DIAGNOSIS — G47.50 PARASOMNIA, UNSPECIFIED TYPE: ICD-10-CM

## 2024-02-06 DIAGNOSIS — R53.83 FATIGUE, UNSPECIFIED TYPE: ICD-10-CM

## 2024-02-06 DIAGNOSIS — G47.10 HYPERSOMNIA: ICD-10-CM

## 2024-02-06 DIAGNOSIS — G47.19 EXCESSIVE DAYTIME SLEEPINESS: ICD-10-CM

## 2024-02-06 DIAGNOSIS — R29.818 SUSPECTED SLEEP APNEA: Primary | ICD-10-CM

## 2024-02-06 DIAGNOSIS — I10 UNCONTROLLED HYPERTENSION: ICD-10-CM

## 2024-02-06 DIAGNOSIS — G47.26 CIRCADIAN RHYTHM SLEEP DISORDER, SHIFT WORK TYPE: ICD-10-CM

## 2024-02-06 DIAGNOSIS — E66.9 OBESITY (BMI 30-39.9): ICD-10-CM

## 2024-02-06 PROCEDURE — 3077F SYST BP >= 140 MM HG: CPT

## 2024-02-06 PROCEDURE — 1036F TOBACCO NON-USER: CPT

## 2024-02-06 PROCEDURE — 3008F BODY MASS INDEX DOCD: CPT

## 2024-02-06 PROCEDURE — 3080F DIAST BP >= 90 MM HG: CPT

## 2024-02-06 PROCEDURE — 99214 OFFICE O/P EST MOD 30 MIN: CPT

## 2024-02-06 ASSESSMENT — SLEEP AND FATIGUE QUESTIONNAIRES
HOW LIKELY ARE YOU TO NOD OFF OR FALL ASLEEP WHILE LYING DOWN TO REST IN THE AFTERNOON WHEN CIRCUMSTANCES PERMIT: HIGH CHANCE OF DOZING
ESS-CHAD TOTAL SCORE: 12
HOW LIKELY ARE YOU TO NOD OFF OR FALL ASLEEP WHEN YOU ARE A PASSENGER IN A CAR FOR AN HOUR WITHOUT A BREAK: SLIGHT CHANCE OF DOZING
WORRIED_DISTRESSED_DUE_TO_SLEEP: VERY MUCH NOTICEABLE
DIFFICULTY_STAYING_ASLEEP: MODERATE
SLEEP_PROBLEM_NOTICEABLE_TO_OTHERS: MUCH
HOW LIKELY ARE YOU TO NOD OFF OR FALL ASLEEP IN A CAR, WHILE STOPPED FOR A FEW MINUTES IN TRAFFIC: SLIGHT CHANCE OF DOZING
SLEEP_PROBLEM_INTERFERES_DAILY_ACTIVITIES: VERY MUCH NOTICEABLE
HOW LIKELY ARE YOU TO NOD OFF OR FALL ASLEEP WHILE SITTING AND READING: MODERATE CHANCE OF DOZING
SITING INACTIVE IN A PUBLIC PLACE LIKE A CLASS ROOM OR A MOVIE THEATER: MODERATE CHANCE OF DOZING
HOW LIKELY ARE YOU TO NOD OFF OR FALL ASLEEP WHILE WATCHING TV: HIGH CHANCE OF DOZING
DIFFICULTY_FALLING_ASLEEP: MILD
HOW LIKELY ARE YOU TO NOD OFF OR FALL ASLEEP WHILE SITTING QUIETLY AFTER LUNCH WITHOUT ALCOHOL: WOULD NEVER DOZE
HOW LIKELY ARE YOU TO NOD OFF OR FALL ASLEEP WHILE SITTING AND TALKING TO SOMEONE: WOULD NEVER DOZE
SATISFACTION_WITH_CURRENT_SLEEP_PATTERN: VERY DISSATISFIED

## 2024-02-06 ASSESSMENT — ENCOUNTER SYMPTOMS
COUGH: 0
HALLUCINATIONS: 0
MYALGIAS: 1
CHEST TIGHTNESS: 0
SHORTNESS OF BREATH: 0
FATIGUE: 1
NERVOUS/ANXIOUS: 1
SLEEP DISTURBANCE: 1
HEADACHES: 1
TREMORS: 0
ABDOMINAL PAIN: 0
APNEA: 0
CONFUSION: 0
BRUISES/BLEEDS EASILY: 0
DECREASED CONCENTRATION: 1
BACK PAIN: 0
LIGHT-HEADEDNESS: 0
FREQUENCY: 0
SEIZURES: 0
WHEEZING: 0
ARTHRALGIAS: 1
WEAKNESS: 0
DIZZINESS: 0
PALPITATIONS: 0

## 2024-02-06 ASSESSMENT — ANXIETY QUESTIONNAIRES
2. NOT BEING ABLE TO STOP OR CONTROL WORRYING: NOT AT ALL
7. FEELING AFRAID AS IF SOMETHING AWFUL MIGHT HAPPEN: NOT AT ALL
6. BECOMING EASILY ANNOYED OR IRRITABLE: NOT AT ALL
5. BEING SO RESTLESS THAT IT IS HARD TO SIT STILL: NOT AT ALL
3. WORRYING TOO MUCH ABOUT DIFFERENT THINGS: NOT AT ALL
1. FEELING NERVOUS, ANXIOUS, OR ON EDGE: SEVERAL DAYS
4. TROUBLE RELAXING: NOT AT ALL
GAD7 TOTAL SCORE: 1

## 2024-02-06 ASSESSMENT — PATIENT HEALTH QUESTIONNAIRE - PHQ9
1. LITTLE INTEREST OR PLEASURE IN DOING THINGS: MORE THAN HALF THE DAYS
SUM OF ALL RESPONSES TO PHQ9 QUESTIONS 1 AND 2: 2
2. FEELING DOWN, DEPRESSED OR HOPELESS: NOT AT ALL

## 2024-02-06 NOTE — LETTER
Your Provider has ordered you a sleep study.  The process for a home sleep study is as follows:    HOME SLEEP STUDY    Your test was ordered today. It will usually take 2 - 3 business days for Insurance to approve the order.     Once you test is approved it will be sent to the ordering sleep lab. When the sleep lab is notified of the new order, they will reach out to you to get you scheduled for a pick-up date for your Home Sleep Study Kit or notify you of when it will be mailed (CLEVMED).     They usually will reach out to you about 1 week after your test is ordered. Please contact the office at [phone] if you have not heard back from them in 2 weeks after you have seen your provider. See below for list of sleep lab contact numbers, if needed.     Sleep Lab Contact Information:   Main Phone Line (scheduling only): [phone]), option 3  Adult and Pediatric Locations  [facility] (6 years and older): [facility] - 4th floor ([facility]) After hours line: [phone]  [facility] (Main campus: All ages): [facility], 6th floor. After hours line: [phone]   [place] (5 years and older; younger considered on case-by-case basis): [address]; [address], [address]. Scheduling  After hours line: [phone]   [place] (6 years and older): [address]; [address]; [address]   [place] (6 years and older): [address] [facility], [address]  After hours line: [phone]   [place] (13 years and older) in [place]: [address], 2nd floor  After hours line: [phone]   [place] (13 year and older): [address]  After hours line: [phone]     Adult Only Locations:   [place] (18 years and older): [address] [facility] [address]   [place] (18 years and older): [address] [facility]; 4th floor  After hours line: [phone]  [facility] (18 years and older) at  [place]: [address]  After hours line: [phone]

## 2024-02-06 NOTE — PATIENT INSTRUCTIONS
It was a pleasure meeting you today Ese CHRIS Javan     As we discussed today in clinic:    1. Do in-home sleep testing.    2. Encouraged to lose weight.   3. Remember, don't drive when sleepy.   4. Stay off your back when sleeping.  5. Try and maintain a consistent sleep schedule, even on your off days  6. Speak with your PCP about medication such as Mounjaro or Wegovy since the Ozempic worked well - if they do not prescribe, let me know and I will submit for endocrinology referral.   7. Your BP was elevated today in clinic - monitor your BP, if continually elevated or your experience any lightheaded, dizziness, CP, headache or concerning symptoms notify your PCP or go to the nearest ER for evaluation       As a general guideline, please replace your: PAP cushions every 2-4 weeks, mask every 3-6 months, hose every 3-6 months, Filter (disposable) every 2-4 weeks; machine may need replacement in 5-10 years (once they stop working).     Education handouts given: Sleep Study Information    Please follow-up in 4 - 6 weeks after testing    ALWAYS BRING YOUR CPAP / BIPAP WITH YOU TO EVERY APPOINTMENT!  THANKS    EDUCATION WEBSITES for further information:   1. American Academy of Sleep Medicine http://sleepeducation.org  2. National Sleep Foundation: https://sleepfoundation.org  3. American Sleep Apnea Association: https://www.sleepapnea.org (for patients with sleep apnea)  4. Go to www.inspiresleep.com learn about [employer].    FOR QUESTIONS AND CONCERNS:   1. In case of problems with machine or mask interface, please contact your DME company first. [employer] is the company that provides you the machine and/or CPAP supplies. If [employer] if your DME, you can reach them at [phone] or [employer]) [phone].  2. For SLEEP STUDY appointments, please call [phone] ([facility]) or [phone] / [phone] ([facility]) or any other  Sleep Lab location please call [phone]  3. For  MEDICAL QUESTIONS, MEDICATION REFILLS, or CLINIC APPOINTMENT SCHEDULING, please call [phone] and my practice lead Eli would gladly assist you with any concerns.   4. In the event that you are running more than 10 minutes late to your appointment or 5 minutes to virtual, I will kindly ask you to reschedule.    Here at [facility], we wish you a restful sleep!

## 2024-02-06 NOTE — PROGRESS NOTES
Patient: Ese Edouard  : 1975 AGE: 48 y.o. SEX:female   MRN: [id number]   Provider: SANTOSH Patton     Location [facility] SLEEP MEDICINE   Service Date: 2024     PCP: Diana Foreman DO   Referred by: Silvia Avila APRN-CNP            [facility]  NEW PATIENT VISIT NOTE      PATIENT INFORMATION     The patient's referring provider is: Silvia Avila APRN-CNP  The patient's Primary Care Provider: Diana Foreman DO    HISTORY OF PRESENT ILLNESS     Patient ID: Ese Edouard is a 48 y.o. female who presents to a [facility] for evaluation for evaluation for sleep apnea, Excessive Daytime Sleepiness (EDS), Fatigue, Snoring, Parasomnia, Hypersomnia, and uncontrolled HTN    Patient is here alone today.  The patient has pertinent hx of sleep disordered breathing, hypersomnia, parasomnia, shift-worker, sleep disturbance, difficulty falling asleep, difficulty staying asleep, snoring, inadequate sleep hygiene, EDS, fatigue, obesity, HTN, ?TIA, Asthma, allergic rhinitis, GERD, migraines, and chronic pain.     24  Patient here today for evaluation for VINCE. Patient over the past several months has been having issues with controlling BP. She has been on several medications with no success. She reports that possible TIA in the past. Her BP is elevated today in clinic, denied any symptoms of elevated BP. She reports today BP is the best it has been in a while.   She reports that sleep is not good. 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 In the past several years, she has been on rotating vacation replacement which schedule is very variable. She tends to do better on night shift. She reports that she was previously on Ozempic, lost a significant amount of weight. Unfortunately, her insurance stopped covering the medication. She has since gained ~30 lbs back.   She reports occasional  snoring, unrefreshing sleep, daytime sleepiness, fatigue, EDS, drowsy driving, difficulty concentrating, memory issues, difficulty falling and staying asleep. She reports that she noticed these symptoms worsening over the past 6 months or so.   I discussed testing options today with the patient today, HSAT vs In-lab. HSAT is reasonable as patient likely has VINCE based on history and exam and does not have any of the following comorbidities: Afib, CHF, seizures, neuromuscular weakness, hypoventilation, or significant COPD.         SLEEP HISTORY     SLEEP STUDY HISTORY  HSAT ordered today    SLEEP ENVIRONMENT  Sleep location: bed  Sleep status: sleeps with   Preferred sleep position: side  TV in bedroom: no  Room is dark:  Yes  Room is quiet: Yes  Room is cool: Yes  Bed comfort: good    SLEEP HABITS   Activities before bedtime:   Activities in bed: nothing  Clockwatching: No   Smoking: never  ETOH: occasionally  Marijuana: denied   Caffeine: daily  Sleep aids: denied    WEIGHT: gained 30 lb     Claustrophobia: No     STOP-BAN  ESS: 12   MAT: 18  PHQ-2:  POSITIVE  little interest or pleasure = 2  down, depressed or hopeless = 0  CRUZ-7: 1      REVIEW OF SYSTEMS     REVIEW OF SYSTEMS  Sleep-related ROS:  Night symptoms: POSITIVE for snoring and NEGATIVE for witnessed apnea, wake up gasping and/or choking for air, nasal congestion , mouth breathing, night sweats during sleep, waking up with racing heart, heartburn or sour taste in mouth at night, nocturnal cough, and nocturia  Morning symptoms: POSITIVE for unrefreshing sleep and morning dry mouth and NEGATIVE for morning dry mouth and sleep inertia  Daytime symptoms POSITIVE for excessive daytime sleepiness, fatigue, trouble remembering things in daytime, and trouble staying focused in daytime and NEGATIVE for irritability in daytime and drowsy driving  Hypersomnia / narcolepsy symptoms: Patient denies symptoms of a hypersomnolence disorder such as sleep paralysis,  sleep-related hallucinations, recurrent sleep attacks, automatic behaviors, and cataplexy.   Parasomnia symptoms: POSITIVE for sleep talking  Movements in sleep: Patient denies problematic movements in sleep,     Naps:   Yes. Naps ARE/ARENOT: are refreshing.  Fatigue: struggles to carry out day to day responsibilities.    Review of Systems   Constitutional:  Positive for fatigue.   HENT:  Negative for congestion, postnasal drip and tinnitus.    Respiratory:  Negative for apnea, cough, chest tightness, shortness of breath and wheezing.    Cardiovascular:  Negative for chest pain, palpitations and leg swelling.   Gastrointestinal:  Negative for abdominal pain.   Genitourinary:  Negative for enuresis and frequency.   Musculoskeletal:  Positive for arthralgias and myalgias. Negative for back pain and gait problem.   Skin: Negative.    Allergic/Immunologic: Negative for environmental allergies.   Neurological:  Positive for headaches. Negative for dizziness, tremors, seizures, weakness and light-headedness.   Hematological:  Does not bruise/bleed easily.   Psychiatric/Behavioral:  Positive for decreased concentration and sleep disturbance. Negative for confusion, hallucinations and suicidal ideas. The patient is nervous/anxious.    All other systems reviewed and are negative.      All other systems have been reviewed and are negative.      ALLERGIES AND MEDICATIONS     ALLERGIES  Allergies   Allergen Reactions    Black Pepper Unknown    Codeine Unknown    Fish Derived Unknown     Per Allergy Testing by Dr. Kurt Nair    Grass Pollen Runny nose     Per Allergy Testing by Dr. Kurt Nair    House Dust Mite Other     Per Allergy Testing by Dr. Kurt Nair    Peanut Unknown    Shellfish Derived Unknown    Sunflower Seed Unknown    Weed Pollen Runny nose     Per Allergy Testing by Dr. Kurt Nair       MEDICATIONS  Current Outpatient Medications   Medication Sig Dispense Refill     albuterol 90 mcg/actuation inhaler inhale 1 to 2 puffs by mouth every 4 to 6 hours as needed      carvedilol (Coreg) 6.25 mg tablet Take 1 tablet (6.25 mg) by mouth 2 times a day with meals. 60 tablet 11    DULoxetine (Cymbalta) 30 mg DR capsule Take 1 capsule (30 mg) by mouth once daily.      EPINEPHrine 0.3 mg/0.3 mL injection syringe USE AS DIRECTED FOR BEE STING      lisinopril 5 mg tablet Take 1 tablet (5 mg) by mouth once daily. 30 tablet 11     No current facility-administered medications for this visit.         PAST HISTORY     PERTINENT PAST MEDICAL HISTORY: See HPI    PERTINENT PAST SURGICAL HISTORY for Sleep Medicine:  non-contributory    PERTINENT FAMILY HISTORY for Sleep Medicine:  Patient denies family history of any sleep disorder.  Patient denies family history of sleep apnea.    PERTINENT SOCIAL HISTORY:  She  reports that she has never smoked. She has never been exposed to tobacco smoke. She has never used smokeless tobacco. She reports current alcohol use of about 1.0 standard drink of alcohol per week. She reports that she does not use drugs. She currently lives with family and employed full-time    Active Problems, Allergy List, Medication List, and PMH/PSH/FH/Social Hx have been reviewed and reconciled in chart. No significant changes unless documented in the pertinent chart section. Updates made when necessary.       PHYSICAL EXAM     VITAL SIGNS: BP (!) 162/102   Pulse 77   Wt 91.6 kg (201 lb 14.4 oz)   SpO2 96%   BMI 35.76 kg/m²     CURRENT WEIGHT:   Vitals:    02/06/24 1330   Weight: 91.6 kg (201 lb 14.4 oz)      BMI: [unfilled]    PREVIOUS WEIGHTS:  Wt Readings from Last 3 Encounters:   02/06/24 91.6 kg (201 lb 14.4 oz)   01/22/24 93 kg (205 lb 0.4 oz)   10/27/23 84.8 kg (187 lb)       Physical Exam  Constitutional: Awake, not in distress  Lungs: Clear to auscultation bilateral, no cough noted  Heart: Regular rate and rhythm  Skin: Warm, no rash  Neuro: No tremors, moves all  extremities  Psych: alert and oriented to time, place, and person    ENT: Modified Mallampati score - IV            RESULTS/DATA     Iron (ug/dL)   Date Value   02/16/2023 87       Bicarbonate   Date Value Ref Range Status   10/27/2023 29 21 - 32 mmol/L Final       PAP Adherence  Not applicable      ASSESSMENT/PLAN     Assessment/Plan   Ese Edouard is a 48 y.o. female presents today in [facility] with the following problems:    SLEEP DISORDERED BREATHING/SUSPECTED SLEEP APNEA and SNORING,   - Patient's risk factors for VINCE: BMI, neck circumference, HTN, use of ETOH, and narrow crowded upper airway anatomy  - Current symptoms are: see HPI  - We discussed testing options today in clinic, HSAT vs In-lab  - HSAT is reasonable as patient likely has VINCE based on history and exam and does not have any of the following comorbidities: Afib, CHF, seizures, neuromuscular weakness, hypoventilation, or significant COPD.   - Discussed VINCE pathophysiology, diagnostic testing (HST vs PSG), cardiometabolic and neurocognitive sequelae of untreated VINCE, and treatment options (PAP therapy, oral appliance, surgery, hypoglossal nerve stimulator called INSPIRE, etc).        INADEQUATE SLEEP HYGIENE / EXCESSIVE DAYTIME SLEEPINESS (EDS) / FATIGUE  + SLEEP DISTURBANCES  - due to combination of poor sleep hygiene, anxiety, untreated sleep apnea, and chronic pain. Per review of medication list, it does NOT appear medications are a contributing factor.  - discussed with patient good sleep hygiene   - Important to get good daily dose of natural sunlight to help wakefulness & energy  - Reduce artificial lighting at night, especially light from screens (i.e. 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 Make sure your room is dark, quiet, cool and interruptions are eliminated. Avoid light in the mornings, wear dark sunglasses driving home. Expose yourself to bright light or daylight upon waking. Avoid caffeine at least 6 - 8 hours prior to sleeping. Avoid smoking at least 2- 4 hours prior to bedtime.   - see sleep hygiene handout & shift work tips     PARASOMNIA:  SLEEP TALKING / NIGHTMARES / VIVID DREAMS  - see HPI for reported sleep symptoms  - discussed with patient safety protocol  Safety protocol      - Make sure you and your bed partner are safe.  - Lock bedroom doors and windows.      - Hide your car keys if needed      - Pad your headboard and move furniture away from the bed.     - Ensure no sharp objects are available that could hurt you or someone else or could be used a weapon      - Put pillows in between you and your bed partner if you are kicking or hitting. Consider sleeping separately.      - Remove clutter and furniture from bedroom floor to avoid injuring yourself. Consider moving your mattress to the floor or can place padded floor mats next to bed         OBESITY  - BMI today Body mass index is 35.76 kg/m².   - ~30 lb weight gain   - Encouraged patient to lose weight with diet and exercise.   - Weight loss can help in the long term treatment of VINCE.  - call PCP and see if they will prescribe Wegovy or mounjaro for weight loss, if they do not prescribe I will place an endocrinologist referral  - Defer management to PCP      HYPERTENSION  - BP today 162/102  - uncontrolled for several months with multiple medications and one hospitalization  - denies any  headache, blurry vision, chest pain, palpitation, dizziness, lightheadedness, or syncopal episodes  - encouraged daily exercise with healthy diet for BP and VINCE management  - Defer management to PCP / Cardiology    DEPRESSION  / ANXIETY   + screens  - denies any SI, HI or hallucinations   - currently on medication  - defer management to PCP     SMOKING STATUS screen  - never a smoker        All of patient's questions were answered. She verbalizes understanding and agreement with my assessment and plan.

## 2024-02-12 ENCOUNTER — APPOINTMENT (OUTPATIENT)
Dept: CARDIOLOGY | Facility: CLINIC | Age: 49
End: 2024-02-12
Payer: COMMERCIAL

## 2024-03-01 ENCOUNTER — CLINICAL SUPPORT (OUTPATIENT)
Dept: SLEEP MEDICINE | Facility: HOSPITAL | Age: 49
End: 2024-03-01
Payer: COMMERCIAL

## 2024-03-01 DIAGNOSIS — I10 UNCONTROLLED HYPERTENSION: ICD-10-CM

## 2024-03-01 DIAGNOSIS — G47.10 HYPERSOMNIA: ICD-10-CM

## 2024-03-01 DIAGNOSIS — R29.818 SUSPECTED SLEEP APNEA: ICD-10-CM

## 2024-03-01 PROCEDURE — 95806 SLEEP STUDY UNATT&RESP EFFT: CPT | Performed by: ALLERGY & IMMUNOLOGY

## 2024-03-11 ENCOUNTER — APPOINTMENT (OUTPATIENT)
Dept: CARDIOLOGY | Facility: CLINIC | Age: 49
End: 2024-03-11
Payer: COMMERCIAL

## 2024-03-12 ENCOUNTER — APPOINTMENT (OUTPATIENT)
Dept: SLEEP MEDICINE | Facility: CLINIC | Age: 49
End: 2024-03-12
Payer: COMMERCIAL

## 2024-03-19 ENCOUNTER — OFFICE VISIT (OUTPATIENT)
Dept: SLEEP MEDICINE | Facility: CLINIC | Age: 49
End: 2024-03-19
Payer: COMMERCIAL

## 2024-03-19 VITALS
SYSTOLIC BLOOD PRESSURE: 135 MMHG | WEIGHT: 208 LBS | DIASTOLIC BLOOD PRESSURE: 93 MMHG | HEART RATE: 67 BPM | OXYGEN SATURATION: 97 % | BODY MASS INDEX: 36.85 KG/M2

## 2024-03-19 DIAGNOSIS — G47.19 EXCESSIVE DAYTIME SLEEPINESS: ICD-10-CM

## 2024-03-19 DIAGNOSIS — F41.9 ANXIETY: ICD-10-CM

## 2024-03-19 DIAGNOSIS — I10 UNCONTROLLED HYPERTENSION: ICD-10-CM

## 2024-03-19 DIAGNOSIS — G47.10 HYPERSOMNIA: ICD-10-CM

## 2024-03-19 DIAGNOSIS — G47.50 PARASOMNIA, UNSPECIFIED TYPE: ICD-10-CM

## 2024-03-19 DIAGNOSIS — E66.01 CLASS 2 SEVERE OBESITY DUE TO EXCESS CALORIES WITH SERIOUS COMORBIDITY AND BODY MASS INDEX (BMI) OF 36.0 TO 36.9 IN ADULT (MULTI): Primary | ICD-10-CM

## 2024-03-19 DIAGNOSIS — E66.9 OBESITY (BMI 30-39.9): ICD-10-CM

## 2024-03-19 DIAGNOSIS — G47.26 CIRCADIAN RHYTHM SLEEP DISORDER, SHIFT WORK TYPE: ICD-10-CM

## 2024-03-19 DIAGNOSIS — R53.83 FATIGUE, UNSPECIFIED TYPE: ICD-10-CM

## 2024-03-19 DIAGNOSIS — G47.33 OSA (OBSTRUCTIVE SLEEP APNEA): Primary | ICD-10-CM

## 2024-03-19 DIAGNOSIS — Z72.821 INADEQUATE SLEEP HYGIENE: ICD-10-CM

## 2024-03-19 PROCEDURE — 3080F DIAST BP >= 90 MM HG: CPT

## 2024-03-19 PROCEDURE — 3008F BODY MASS INDEX DOCD: CPT

## 2024-03-19 PROCEDURE — 1036F TOBACCO NON-USER: CPT

## 2024-03-19 PROCEDURE — 99214 OFFICE O/P EST MOD 30 MIN: CPT

## 2024-03-19 PROCEDURE — 3075F SYST BP GE 130 - 139MM HG: CPT

## 2024-03-19 ASSESSMENT — SLEEP AND FATIGUE QUESTIONNAIRES
WORRIED_DISTRESSED_DUE_TO_SLEEP: MUCH
HOW LIKELY ARE YOU TO NOD OFF OR FALL ASLEEP WHILE LYING DOWN TO REST IN THE AFTERNOON WHEN CIRCUMSTANCES PERMIT: HIGH CHANCE OF DOZING
SATISFACTION_WITH_CURRENT_SLEEP_PATTERN: DISSATISFIED
HOW LIKELY ARE YOU TO NOD OFF OR FALL ASLEEP IN A CAR, WHILE STOPPED FOR A FEW MINUTES IN TRAFFIC: SLIGHT CHANCE OF DOZING
DIFFICULTY_FALLING_ASLEEP: MODERATE
HOW LIKELY ARE YOU TO NOD OFF OR FALL ASLEEP WHEN YOU ARE A PASSENGER IN A CAR FOR AN HOUR WITHOUT A BREAK: SLIGHT CHANCE OF DOZING
ESS-CHAD TOTAL SCORE: 11
SITING INACTIVE IN A PUBLIC PLACE LIKE A CLASS ROOM OR A MOVIE THEATER: SLIGHT CHANCE OF DOZING
HOW LIKELY ARE YOU TO NOD OFF OR FALL ASLEEP WHILE SITTING AND TALKING TO SOMEONE: WOULD NEVER DOZE
SLEEP_PROBLEM_INTERFERES_DAILY_ACTIVITIES: VERY MUCH NOTICEABLE
DIFFICULTY_STAYING_ASLEEP: SEVERE
HOW LIKELY ARE YOU TO NOD OFF OR FALL ASLEEP WHILE WATCHING TV: HIGH CHANCE OF DOZING
SLEEP_PROBLEM_NOTICEABLE_TO_OTHERS: MUCH
HOW LIKELY ARE YOU TO NOD OFF OR FALL ASLEEP WHILE SITTING AND READING: MODERATE CHANCE OF DOZING
HOW LIKELY ARE YOU TO NOD OFF OR FALL ASLEEP WHILE SITTING QUIETLY AFTER LUNCH WITHOUT ALCOHOL: WOULD NEVER DOZE

## 2024-03-19 ASSESSMENT — ENCOUNTER SYMPTOMS
NERVOUS/ANXIOUS: 1
SHORTNESS OF BREATH: 0
SLEEP DISTURBANCE: 1
DECREASED CONCENTRATION: 0
UNEXPECTED WEIGHT CHANGE: 1
COUGH: 0

## 2024-03-19 ASSESSMENT — ANXIETY QUESTIONNAIRES
GAD7 TOTAL SCORE: 3
7. FEELING AFRAID AS IF SOMETHING AWFUL MIGHT HAPPEN: NOT AT ALL
1. FEELING NERVOUS, ANXIOUS, OR ON EDGE: SEVERAL DAYS
6. BECOMING EASILY ANNOYED OR IRRITABLE: NOT AT ALL
5. BEING SO RESTLESS THAT IT IS HARD TO SIT STILL: SEVERAL DAYS
3. WORRYING TOO MUCH ABOUT DIFFERENT THINGS: NOT AT ALL
4. TROUBLE RELAXING: SEVERAL DAYS
2. NOT BEING ABLE TO STOP OR CONTROL WORRYING: NOT AT ALL

## 2024-03-19 ASSESSMENT — PATIENT HEALTH QUESTIONNAIRE - PHQ9
1. LITTLE INTEREST OR PLEASURE IN DOING THINGS: NOT AT ALL
2. FEELING DOWN, DEPRESSED OR HOPELESS: NOT AT ALL
SUM OF ALL RESPONSES TO PHQ9 QUESTIONS 1 AND 2: 0

## 2024-03-19 NOTE — PATIENT INSTRUCTIONS
It was a pleasure meeting you today Ese Edouard     CURRENT DME: [facility]    As we discussed today in clinic:    1. I ordered you a CPAP machine today. We discussed about 30-day mask guarantee and insurance requirement for PAP compliance.    2. Encouraged to lose weight.   3. Remember, don't drive when sleepy.   4. We will start with Resmed N30i mask. If this does not work, call [facility] and request a new mask in your first 30 days  5. I will send over a referral to endocrinology for diabetes and weight loss management    Weight loss injectable medications to call your insurance to see what is covered:  Mounjaro  Zepbound  Wegovy  Ozempic    As a general guideline, please replace your: PAP cushions every 2-4 weeks, mask every 3-6 months, hose every 3-6 months, Filter (disposable) every 2-4 weeks; machine may need replacement in 5-10 years (once they stop working).    Education handouts given: Sleep Apnea Information , Sleep Hygiene , PAP Handouts, and Cleaning Schedule    Please follow-up in 31 - 90 days after getting new machine.    ALWAYS BRING YOUR CPAP / BIPAP WITH YOU TO EVERY APPOINTMENT!  THANKS    FOR QUESTIONS AND CONCERNS:   1. In case of problems with machine or mask interface, please contact your DME company first. DME is the company that provides you the machine and/or CPAP supplies. If [employer] if your DME, you can reach them at [phone] or [employer]) [phone].  2. For SLEEP STUDY appointments, please call [phone] ([place]) or [phone] / [phone] ([facility]) or any other  Sleep Lab location please call [phone]  3. For MEDICAL QUESTIONS, MEDICATION REFILLS, or CLINIC APPOINTMENT SCHEDULING, please call [phone] and my practice lead Eli would gladly assist you with any concerns.   4. In the event that you are running more than 10 minutes late to your appointment or 5 minutes to virtual, I will kindly ask you to reschedule.    Here at [facility], we wish you a restful sleep!

## 2024-03-19 NOTE — LETTER
Sleep Apnea Information    What is sleep apnea? --- Sleep apnea is a condition that makes you stop breathing for short periods while you are asleep. There are 2 types of sleep apnea. One is called “obstructive sleep apnea” and the other is called “central sleep apnea”.     In obstructive sleep apnea, you stop breathing because your throat narrows or closes.    In central sleep apnea, you stop breathing because your brain does not send the right signals to your muscles to make you breathe.  When people talk about sleep apnea, they are usually referring to obstructive sleep apnea. People with sleep apnea do not know that they stop breathing when they are asleep. But they do sometimes wake up startled or gasping for breath. They also often hear from loved ones that they snore.    What are the symptoms of sleep apnea? --- The main symptoms of sleep apnea are loud snoring, tiredness, and daytime sleepiness. Other symptoms can include:  Restless sleep  Waking up choking or gasping in the middle of your sleep  Morning headaches, dry mouth, or sore throat  Waking up often to urinate  Waking up feeling unrested or groggy  Trouble thinking clearly or remembering things  Some people with sleep apnea don't have symptoms or they don't know they have them. They might figure that it's normal to be tired or to snore a lot.    Is there a test for sleep apnea? --- Yes. If your provider suspects you have sleep apneas, he or she might send you for a “sleep study”. Sleep studies can sometimes be done at home, but they are usually done in a sleep lab. For the sleep study done in a sleep lab, you have to spend the night and sleep in the lab while being hooked up to different machines that monitor your heart rate, breathing, and other body functions. The results of the test will tell your provider if you have the disorder.    Is there anything I can do on my own to help my sleep apnea? --- Yes. Here are some thing that might help:  Stay  off your back when sleeping (this is not always practical, because people cannot control their position while asleep. Plus, it only helped some people)  Lose weight, if you are overweight.  Avoid alcohol and sedative-hypnotic drugs because they can make sleep apnea wore.    How is sleep apnea treated? --- As mentioned above weight loss can help if you are overweight or obese. But losing weight can be challenging, and it takes time to lose enough weight to help with your sleep apnea. Most people need other treatment while they work on losing weight.    The most effective treatment for sleep apnea is a device that keeps your airway open while you sleep. Treatment with this device is called “continuous positive airway pressure,” or CPAP. People getting CPAP wear a face mask at night that keeps them breathing.  If you doctor or nurse recommends a CPAP machine, try to be patient about using it. The mask might seem uncomfortable to wear at first, and the machine might seem noisy, but using the machine can really pay off. People with sleep apnea who use a CPAP machine feel more rested and generally feel better.    There is also another device that you wear in your mouth called an “oral appliance” or mandibular advancement device.” It also helps keep your airway open while you sleep.    In rare cases, when nothing else help, doctors recommend surgery to keep the airway open. Surgery to do this is not always effective, and even when it is, the problem can come back. In other people, CPAP is still needed after surgery.    Lastly, in certain circumstances, doctors may recommend the hypoglossal nerve stimulator also known as Inspire. Inspire is a device that is being implanted under the skin below your right collage bone. It has its remote control that you can use to activate it at bedtime. It basically moves your tongue forward while you are sleeping to open up your airway however, not everybody is a good candidate for  Inspire; hence, you will need a comprehensive evaluation with testing such as sleep study and IDISE (drug-induced sleep endoscopy) from a sleep specialist before getting Inspire implanted.    Is sleep apnea dangerous? --- It can be. People with sleep apnea do not get good-quality sleep, so they are often tired and not alert. This puts them at risk for car accidents and other types of accidents. Plus, studies show that people with sleep apnea are more likely than others to have high blood pressure, heart attacks and other serious heart problems. In people with sever sleep apnea, getting treated (for example with a CPAP machine) can help prevent some of these problems.      HEALTHY SLEEP TIPS FOR PATIENTS WITH VINCE    Weight Reduction  An increase in your weight will make your snoring and sleep-related breathing irregularities worse, and reducing your weight is likely to improve your condition. It is important that you try to reach an ideal body weight and maintain it. Reducing your weight and being more physically active will also decrease the risk of developing cardiovascular disease.    Alcohol Intake  Alcohol is a potent sedative that can induce irregular breathing apneas in health people. Alcohol will not only increase the number of apneas, but will also make the apneas longer and reduce the oxygen level in the blood. It is very important that alcohol be avoided within 4-6 hours before bedtime.    Other Sedating Medications  Sedating medications include sleeping pills, some antihistamines, anti-allergy pills, some cold medications, some epilepsy drugs, opiates and narcotic-type pain medications, and other medications for psychiatric conditions. As with alcohol, sedative medications are likely to worsen your breathing irregularities. Definitely, do not use any sedating medications with alcohol and pain medications as the effects will be potentiated several times.    Sleep Deprivation  Try to maintain a regular  schedule with adequate amount of sleeping time. Any loss of sleep will increase your need for sleep the next night, and your breathing irregularities are likely to become more severe.     Sleep Position  Sleeping on your back is likely to increase breathing irregularities as your tongue tends to obstruct the back of your throat resulting in a decrease in the size of the airway. Some find that sewing a tennis ball into the back of a sleeping shirt help prevent back sleeping. You can also find premade positional therapy devices on the market (i.e. slumber bump) work just as well.     Bed Position  Elevate the head of the bed 4-6 inches to take some of the pressure off the diaphragm. This may ease breathing during sleep. You can also put wood blocks under the legs of the head of the bed so it will be on a slight incline.    Colds and Allergies  Any congestion affecting your nasal passages or throat will cause swelling and tend to worsen breathing irregularities. Consider using intranasal steroid spray or nasal saline spray with or without oral non-sedating anti-histamines.    Meals  Large meals should be avoided at least 2 hours before bedtime. A large meal may increase the pressure on the diaphragm and worsen breathing. It may also predispose you to regurgitation of the stomach contents (reflux) during sleep which may provoke irritation of the airway.    Smoking   Nicotine and other substances contained in cigarettes, when inhaled will provoke irritation of the upper airway and worsen breathing irregularities. Try to stop smoking altogether.       Frequency of replacement of CPAP / BIPAP supplies as per Medicare guidelines  (This frequency of replacement can be different with private insurances or Medicaid)    Nasal mask, full face mask, tubing, heated tubing - 1 per 3 months  Headgear, water chamber, chin strap, reusable filter - 1 per 6 months  Disposable filter, replacement cushion, nasal pillows - 2 per  month  Replacement cushion / liner for interface - 1 per month    CPAP / BIPAP Cleaning Recommendations    There are several specialized CPAP cleaning machines on the market. None of them are as good as soap and water. The only thing that you need to clean daily is the part of the mask that contacts your skin also known as the mask insert. This mask insert needs to be cleaned with soap and water daily. Another option is to use baby wipes daily.     The rest of the mask, the water chamber, and the tubing should be cleaned at least once a week.    The water tank needs to be filled with distilled water to prevent buildup of white deposits in the future. If you cannot find distilled water, you can use tap water but expect to have white deposits buildup seen after prolonged use with tap water. If you start seeing white deposits on the water tank, you can clean it by filling it with equal parts of distilled white vinegar and water. Let the vinegar-water mixture sit for 2 hours, and then rinse it with running tap water. Clean with soap and water then let it dry.

## 2024-03-19 NOTE — PROGRESS NOTES
Patient: Ese Edouard  : 1975 AGE: 48 y.o. SEX:female   MRN: [id number]   Provider: SANTOSH Patton     Location [facility] SLEEP [facility]IC[facility]   Service Date: 3/19/2024     PCP: Diana Foreman DO   Referred by:               [facility]  FOLLOW-UP VISIT NOTE        HISTORY OF PRESENT ILLNESS     Patient ID: Ese Edouard is a 48 y.o. female who presents to a [facility] for follow-up review of sleep study results    Patient is here alone today.  The patient has pertinent hx of VINCE, hypersomnia, parasomnia, shift-worker, sleep disturbance, difficulty falling asleep, difficulty staying asleep, snoring, inadequate sleep hygiene, EDS, fatigue, obesity, HTN, ?TIA, Asthma, allergic rhinitis, GERD, migraines, and chronic pain.     Previous Visit's:  24  Patient here today for evaluation for VINCE. Patient over the past several months has been having issues with controlling BP. She has been on several medications with no success. She reports that possible TIA in the past. Her BP is elevated today in clinic, denied any symptoms of elevated BP. She reports today BP is the best it has been in a while.   She reports that sleep is not good. 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 In the past several years, she has been on rotating vacation replacement which schedule is very variable. She tends to do better on night shift. She reports that she was previously on Ozempic, lost a significant amount of weight. Unfortunately, her insurance stopped covering the medication. She has since gained ~30 lbs back.   She reports occasional snoring, unrefreshing sleep, daytime sleepiness, fatigue, EDS, drowsy driving, difficulty concentrating, memory issues, difficulty falling and staying asleep. She reports that she noticed these symptoms worsening over the past 6 months or so.   I discussed testing options  today with the patient today, HSAT vs In-lab. HSAT is reasonable as patient likely has VINCE based on history and exam and does not have any of the following comorbidities: Afib, CHF, seizures, neuromuscular weakness, hypoventilation, or significant COPD.       Interval History  Patient was last seen in 2/2024.     03/19/24   Since last visit, patient had completed sleep study which showed moderate VINCE. I reviewed the sleep study results today in depth with the patient, including the difference between 3% vs 4% scoring guidelines. She falls under the 3% scoring guidelines which was moderate VINCE at 19.1/hr with SpO2 nilam of 77.9%. I discussed with her treatment options. Given that she has moderate VINCE, it is recommended treatment with CPAP. I reviewed the CPAP machine as well as different styles of mask. She is concerned about the mask, she was afraid of the FFM. I reviewed nasal masks, she is more comfortable with nasal masks, will start with Resmed N30i mask.   She continues to gain weight since being off of Ozempic. Also, her diabetes symptoms are coming back. I discussed with her sending back to PCP or endocrinology referral, she would like to see endocrinology. I did give her a list of current injectables for weight loss to reach out to insurance to see what is covered. Discussed that with weight loss in the future, we can restest for the severity of her VINCE and potentially discontinue CPAP in the future.   BP is WNL.   +CRUZ        SLEEP HISTORY     SLEEP STUDY HISTORY  HSAT - 3/1/2024  BMI - 35.44  TRT - 653.4 mins  TMT - 611 mins  AHI3% - 19.1/hr  AHI4% - 8.6/hr  MARTI - 1.4/hr  Supine AHI - 20.1/hr  Non-supine AHI - 13.4/hr  SpO2 nilam - 77.9%   <88% = 3.2 mins    SLEEP ENVIRONMENT  Sleep location: bed  Sleep status: sleeps with   Preferred sleep position: side  TV in bedroom: no  Room is dark:  Yes  Room is quiet: Yes  Room is cool: Yes  Bed comfort: good    SLEEP HABITS   Activities before bedtime:    Activities in bed: nothing  Clockwatching: No   Smoking: never  ETOH: occasionally  Marijuana: denied   Caffeine: daily  Sleep aids: denied    WEIGHT: gained 8 lbs     REVIEW OF SYSTEMS     REVIEW OF SYSTEMS  SLEEP ROS   Review of Systems   Constitutional:  Positive for unexpected weight change.        Weight gain   HENT:  Negative for congestion.    Respiratory:  Negative for cough and shortness of breath.    Cardiovascular:  Negative for chest pain.   Psychiatric/Behavioral:  Positive for sleep disturbance. Negative for decreased concentration. The patient is nervous/anxious.      SLEEP ROS: excessive daytime sleepiness, awakening in the middle of the night because of unknown, difficulty falling asleep once awakened, feels sleepy during the day    All other systems have been reviewed and are negative.      ALLERGIES     Allergies   Allergen Reactions    Black Pepper Unknown    Codeine Unknown    Fish Derived Unknown     Per Allergy Testing by Dr. Kurt Nair    Grass Pollen Runny nose     Per Allergy Testing by Dr. Kurt Nair    House Dust Mite Other     Per Allergy Testing by Dr. Kurt Nair    Peanut Unknown    Shellfish Derived Unknown    Sunflower Seed Unknown    Weed Pollen Runny nose     Per Allergy Testing by Dr. Kurt Nair       MEDICATIONS     Current Outpatient Medications   Medication Sig Dispense Refill    albuterol 90 mcg/actuation inhaler inhale 1 to 2 puffs by mouth every 4 to 6 hours as needed      carvedilol (Coreg) 6.25 mg tablet Take 1 tablet (6.25 mg) by mouth 2 times a day with meals. 60 tablet 11    DULoxetine (Cymbalta) 30 mg DR capsule Take 1 capsule (30 mg) by mouth once daily.      EPINEPHrine 0.3 mg/0.3 mL injection syringe USE AS DIRECTED FOR BEE STING      lisinopril 5 mg tablet Take 1 tablet (5 mg) by mouth once daily. 30 tablet 11     No current facility-administered medications for this visit.       PAST HISTORY     PERTINENT PAST  MEDICAL HISTORY: See HPI    PERTINENT PAST SURGICAL HISTORY for Sleep Medicine:  non-contributory    PERTINENT FAMILY HISTORY for Sleep Medicine:  Patient denies family history of any sleep disorder.  Patient denies family history of sleep apnea.    PERTINENT SOCIAL HISTORY:  She  reports that she has never smoked. She has never been exposed to tobacco smoke. She has never used smokeless tobacco. She reports current alcohol use of about 1.0 standard drink of alcohol per week. She reports that she does not use drugs. She currently lives with family and employed full-time    Active Problems, Allergy List, Medication List, and PMH/PSH/FH/Social Hx have been reviewed and reconciled in chart. No significant changes unless documented in the pertinent chart section. Updates made when necessary.     PHYSICAL EXAM     VITAL SIGNS: BP (!) 135/93   Pulse 67   Wt 94.3 kg (208 lb)   SpO2 97%   BMI 36.85 kg/m²     CURRENT WEIGHT:   Vitals:    03/19/24 1118   Weight: 94.3 kg (208 lb)      BMI: Body mass index is 36.85 kg/m².     PREVIOUS WEIGHTS:  Wt Readings from Last 3 Encounters:   03/19/24 94.3 kg (208 lb)   02/06/24 91.6 kg (201 lb 14.4 oz)   01/22/24 93 kg (205 lb 0.4 oz)       Today ESS: 11  Today MAT: 18  Today CRUZ-7: 3   Today PHQ-2: NEGATIVE SCREEN    Physical Exam  PHYSICAL EXAMINATION  General appearance: awake alert in NAD  Affect: normal  Skin: no rash noted to face  HEENT: Nasal congestion absent  Teeth: normal dentition  Lungs: no cough.  Extr: moves all four extremities  Neuro: normal speech        RESULTS/DATA     Iron (ug/dL)   Date Value   02/16/2023 87       Bicarbonate   Date Value Ref Range Status   10/27/2023 29 21 - 32 mmol/L Final       PAP Adherence  Not applicable    ASSESSMENT/PLAN     Assessment/Plan   Ese Edouard is a 48 y.o. female presents today in [facility] with the following problems:    CURRENT DME: HCS    OBSTRUCTIVE SLEEP APNEA, moderate (HSAT AHI:  19.1/hr)  - Patient's risk factors for VINCE: BMI, neck circumference, HTN, use of ETOH, and narrow crowded upper airway anatomy  - VINCE diagnosed by HSAT in 3/2024. Reviewed sleep studies today in clinic. See HPI.  - I recommend starting auto-CPAP 5 -15 cm H2O with EPR 3, heated humidification, heated tubings, and mask fitting session. Orders sent to [facility].   - Discussed about 30-day mask guarantee and insurance requirement for PAP compliance follow-up.   - Supportive management: Lose weight. Stay off your back when sleeping. Avoid smoking, alcohol, and sedating medications if applicable. Don't drive when sleepy.  - Discussed VINCE pathophysiology, cardiometabolic and neurocognitive sequelae of untreated VINCE, and treatment options (PAP therapy, oral appliance, surgery, hypoglossal nerve stimulator called INSPIRE, etc).    - I had face-to-face discussion with the patient about the need for PAP therapy to treat sleep apnea. Patient agreed with the plan and verbalized understanding.      INADEQUATE SLEEP HYGIENE / EXCESSIVE DAYTIME SLEEPINESS (EDS) / FATIGUE  + SLEEP DISTURBANCES  - due to combination of poor sleep hygiene, anxiety, untreated sleep apnea, and chronic pain. Per review of medication list, it does NOT appear medications are a contributing factor.  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 Make sure your room is dark, quiet, cool and interruptions are eliminated. Avoid light in the mornings, wear dark sunglasses driving home. Expose yourself to bright light or daylight upon waking. Avoid caffeine at least 6 - 8 hours prior to sleeping. Avoid smoking at least 2- 4 hours prior to  bedtime.   - see sleep hygiene handout & shift work tips   03/19/24  - no significant changes noted today  - continues to work variable shifts    PARASOMNIA:  SLEEP TALKING / NIGHTMARES / VIVID DREAMS  - see HPI for reported sleep symptoms  - discussed with patient safety protocol  03/19/24  - no recent reported episodes    OBESITY  - BMI today Body mass index is 36.85 kg/m².   - 8 lb weight gain   - steadily increasing since being off Ozempic  - Encouraged patient to lose weight with diet and exercise.   - Weight loss can help in the long term treatment of VINCE.  - Defer management to PCP   - referral to endocrinology for diabetes and weight management    HYPERTENSION  - BP today 135/93  - well controlled with medication, denies any issues with management   - encouraged daily exercise with healthy diet for BP and VINCE management  - Defer management to PCP    ANXIETY  + CRUZ screen, NEGATIVE SCREEN PHQ-2   - denies any SI, HI or hallucinations   - currently on meds  - defer management to PCP     SMOKING STATUS screen  - never a smoker     All of patient's questions were answered. She verbalizes understanding and agreement with my assessment and plan.

## 2024-04-15 ENCOUNTER — OFFICE VISIT (OUTPATIENT)
Dept: CARDIOLOGY | Facility: CLINIC | Age: 49
End: 2024-04-15
Payer: COMMERCIAL

## 2024-04-15 VITALS — HEIGHT: 63 IN | WEIGHT: 207.01 LBS | BODY MASS INDEX: 36.68 KG/M2

## 2024-04-15 DIAGNOSIS — G47.33 OSA (OBSTRUCTIVE SLEEP APNEA): ICD-10-CM

## 2024-04-15 DIAGNOSIS — I10 PRIMARY HYPERTENSION: ICD-10-CM

## 2024-04-15 DIAGNOSIS — E66.9 OBESITY (BMI 30-39.9): Primary | ICD-10-CM

## 2024-04-15 DIAGNOSIS — I10 UNCONTROLLED HYPERTENSION: ICD-10-CM

## 2024-04-15 PROBLEM — R29.818 SUSPECTED SLEEP APNEA: Status: RESOLVED | Noted: 2024-01-22 | Resolved: 2024-04-15

## 2024-04-15 PROCEDURE — 99214 OFFICE O/P EST MOD 30 MIN: CPT | Performed by: NURSE PRACTITIONER

## 2024-04-15 PROCEDURE — 3008F BODY MASS INDEX DOCD: CPT | Performed by: NURSE PRACTITIONER

## 2024-04-15 RX ORDER — CARVEDILOL 12.5 MG/1
12.5 TABLET ORAL
Qty: 180 TABLET | Refills: 3 | Status: SHIPPED | OUTPATIENT
Start: 2024-04-15 | End: 2025-04-15

## 2024-04-15 RX ORDER — LISINOPRIL 10 MG/1
10 TABLET ORAL DAILY
Qty: 90 TABLET | Refills: 3 | Status: SHIPPED | OUTPATIENT
Start: 2024-04-15 | End: 2025-04-15

## 2024-04-15 NOTE — LETTER
"April 17, 2024     Diana Foreman DO  [address]   [place] [address]    Patient: Ese Edouard   YOB: 1975   Date of Visit: 4/15/2024       Dear Dr. Diana Foreman DO:    Thank you for referring Ese Edouard to me for evaluation. Below are my notes for this consultation.  If you have questions, please do not hesitate to call me. I look forward to following your patient along with you.       Sincerely,     Silvia Avila, APRN-CNP      CC: No Recipients  ______________________________________________________________________________________    Primary Care Physician: Diana Foreman DO  Primary Cardiologist:      Date of Visit: 04/15/2024  3:40 PM EDT  Location of visit:  [address]   Type of Visit: Follow up         Chief Complaint   Patient presents with   • Follow-up     Here for follow up on her blood pressure       HPI / Summary:   Ese Edouard is a 48 y.o. female with HTN, obesity (BMI 36), moderate VINCE   Returns for follow up     She feels somewhat better with HTN treatment.  Still with an \"adrenaline\" like discomfort in her chest   Doesn't  have home BP log   Treatment for CPAP is pending due to cost   Renin / aldosterone level is pending collection         Tolerating Coreg and lisinopril without side effects   12 system review is negative except as noted above                  Medical History:   Past Medical History:   Diagnosis Date   • Abnormal ECG    • Acute pain of right knee 06/08/2021   • Asthma (HHS-HCC)    • Contusion of left forearm 09/16/2015   • Diverticulosis of large intestine without hemorrhage 08/23/2018   • Hypertension    • LUQ pain 08/23/2018   • Other intervertebral disc displacement, lumbar region 09/14/2016    Lumbar disc herniation   • Personal history of other diseases of the circulatory system 04/16/2014    History of hypertension   • Personal history of other diseases of the musculoskeletal system and connective tissue 04/16/2014    Personal " history of fibromyalgia   • SOB (shortness of breath) 11/07/2019   • Throat swelling 11/07/2019       Surgical History:   Past Surgical History:   Procedure Laterality Date   • BACK SURGERY  09/14/2016    Lower Back Surgery   • ELBOW SURGERY  09/14/2016    Elbow Surgery   • MR HEAD ANGIO WO IV CONTRAST  1/1/2014    MR HEAD ANGIO WO IV CONTRAST 1/1/2014 [facility] CLINICAL LEGACY   • MR NECK ANGIO WO IV CONTRAST  1/1/2014    MR NECK ANGIO WO IV CONTRAST 1/1/2014 [facility] CLINICAL LEGACY       Family History:   Family History   Problem Relation Name Age of Onset   • Migraines Mother Jose G Gayle    • Other (hypertension, benign) Mother Jose G Gayle    • Diabetes type II Mother Jose G Gayle    • Diabetes Father Tatyana Edouard    • Diabetes type II Father Tatyana Edouard    • Atrial fibrillation Maternal Grandfather Bernard Clay    • Asthma Maternal Grandmother Adurey Clay    • Thyroid disease Maternal Grandmother Audrey Clay        Social History:   Tobacco Use: Low Risk  (4/15/2024)    Patient History    • Smoking Tobacco Use: Never    • Smokeless Tobacco Use: Never    • Passive Exposure: Never             MEDICATIONS:   Current Outpatient Medications   Medication Instructions   • albuterol 90 mcg/actuation inhaler inhale 1 to 2 puffs by mouth every 4 to 6 hours as needed   • carvedilol (COREG) 12.5 mg, oral, 2 times daily with meals   • DULoxetine (Cymbalta) 30 mg DR capsule 1 capsule, oral, Daily   • EPINEPHrine 0.3 mg/0.3 mL injection syringe USE AS DIRECTED FOR BEE STING   • lisinopril 10 mg, oral, Daily   • semaglutide 0.25 mg, subcutaneous, Once Weekly         IMAGING REVIEWED:   Holter monitor full report reviewed     LABS:  CBC:   Lab Results   Component Value Date    WBC 7.6 10/27/2023    RBC 4.89 10/27/2023    HGB 14.3 10/27/2023    HCT 41.9 10/27/2023    MCV 86 10/27/2023    MCH 29.2 10/27/2023    MCHC 34.1 10/27/2023    RDW 12.5 10/27/2023     10/27/2023    MPV 10.3 10/27/2023     CBC with  "Differential:    Lab Results   Component Value Date    WBC 7.6 10/27/2023    RBC 4.89 10/27/2023    HGB 14.3 10/27/2023    HCT 41.9 10/27/2023     10/27/2023    MCV 86 10/27/2023    MCH 29.2 10/27/2023    MCHC 34.1 10/27/2023    RDW 12.5 10/27/2023    NRBC 0.0 10/27/2023    LYMPHOPCT 24.1 10/27/2023    MONOPCT 7.6 10/27/2023    EOSPCT 2.1 10/27/2023    BASOPCT 0.5 10/27/2023    MONOSABS 0.58 10/27/2023    LYMPHSABS 1.84 10/27/2023    EOSABS 0.16 10/27/2023    BASOSABS 0.04 10/27/2023     CMP:    Lab Results   Component Value Date     10/27/2023    K 4.0 10/27/2023     10/27/2023    CO2 29 10/27/2023    BUN 13 10/27/2023    CREATININE 0.70 10/27/2023    GLUCOSE 78 10/27/2023    PROT 7.1 10/27/2023    CALCIUM 9.4 10/27/2023    BILITOT 0.7 10/27/2023    ALKPHOS 57 10/27/2023    AST 16 10/27/2023    ALT 19 10/27/2023     BMP:    Lab Results   Component Value Date     10/27/2023    K 4.0 10/27/2023     10/27/2023    CO2 29 10/27/2023    BUN 13 10/27/2023    CREATININE 0.70 10/27/2023    CALCIUM 9.4 10/27/2023    GLUCOSE 78 10/27/2023     Magnesium:No results found for: \"MG\"  Troponin:    Lab Results   Component Value Date    TROPHS <3 10/27/2023     BNP: No results found for: \"BNP\"    Lipid Panel:  Lab Results   Component Value Date    HDL 58.9 10/27/2023    CHHDL 2.9 10/27/2023    VLDL 19 10/27/2023    TRIG 95 10/27/2023    NHDL 112 10/27/2023        Lab work and imaging results independently reviewed by me         Visit Vitals  Ht 1.6 m (5' 3\")   Wt 93.9 kg (207 lb 0.2 oz)   BMI 36.67 kg/m²   Smoking Status Never   BSA 2.04 m²          ECG dated 10/27/2026 independently reviewed   NSR       Constitutional:       Appearance: Healthy appearance. Not in distress.   Eyes:      Conjunctiva/sclera: Conjunctivae normal.   Neck:      Vascular: JVD normal.   Pulmonary:      Effort: Pulmonary effort is normal.      Breath sounds: Normal breath sounds.   Cardiovascular:      PMI at left " midclavicular line. Normal rate. Regular rhythm. Normal S1. Normal S2.       Murmurs: There is no murmur.      No rub.   Pulses:     Intact distal pulses.   Edema:     Peripheral edema absent.   Abdominal:      General: Bowel sounds are normal.   Musculoskeletal:      Cervical back: Neck supple. Skin:     General: Skin is warm and dry.   Neurological:      Mental Status: Alert and oriented to person, place and time.               Problem List Items Addressed This Visit             ICD-10-CM    Uncontrolled hypertension I10    Relevant Medications    lisinopril 10 mg tablet    carvedilol (Coreg) 12.5 mg tablet    VINCE (obstructive sleep apnea) G47.33    Obesity (BMI 30-39.9) - Primary E66.9     Other Visit Diagnoses         Codes    Primary hypertension     I10    Relevant Medications    carvedilol (Coreg) 12.5 mg tablet              HTN with intolerance to several agents (hydrochlorothiazide, amlodipine, losartan)      Increase carvedilol to 12.5 mg BID  Increase lisinopril to 10 mg daily   Plan to add spironolactone If renin / aldosterone return abnormal or BP remains difficult to control on optimal doses of current Rx       04/17/24 at 10:35 AM - SANTOSH Dodson        Followup Appts:  Future Appointments   Date Time Provider Department Center   5/21/2024 10:00 AM SANTOSH Patton [id number] [facility]   10/14/2024  3:40 PM SANTOSH Dodson [id number] [facility]

## 2024-04-15 NOTE — PATIENT INSTRUCTIONS
Home BP monitoring instructions:::: Goal < 130 / 80   Remain still, Avoid smoking, caffeinated beverages, or exercise within 30 min before BP measurements.  Ensure 5 min of quiet rest before BP measurements.  Sit correctly with back straight and supported (on a straight-backed dining chair, for example, rather than a sofa).  Sit with feet flat on the floor and legs uncrossed.  Keep arm supported on a flat surface (such as a table), with the upper arm at heart level.  Bottom of the cuff should be placed directly above the bend of the elbow  Take a reading in the AM before breakfast 2-3 times / week   Record all readings accurately:  A written log should be brought to all appointments   Monitors with built-in memory should be brought to all clinic appointments and calibrated to our machines

## 2024-04-15 NOTE — PROGRESS NOTES
"Primary Care Physician: Diana Foreman DO  Primary Cardiologist:      Date of Visit: 04/15/2024  3:40 PM EDT  Location of visit:  [facility]   Type of Visit: Follow up         Chief Complaint   Patient presents with    Follow-up     Here for follow up on her blood pressure       HPI / Summary:   Ese Edouard is a 48 y.o. female with HTN, obesity (BMI 36), moderate VINCE   Returns for follow up     She feels somewhat better with HTN treatment.  Still with an \"adrenaline\" like discomfort in her chest   Doesn't  have home BP log   Treatment for CPAP is pending due to cost   Renin / aldosterone level is pending collection         Tolerating Coreg and lisinopril without side effects   12 system review is negative except as noted above                  Medical History:   Past Medical History:   Diagnosis Date    Abnormal ECG     Acute pain of right knee 06/08/2021    Asthma ([facility])     Contusion of left forearm 09/16/2015    Diverticulosis of large intestine without hemorrhage 08/23/2018    Hypertension     LUQ pain 08/23/2018    Other intervertebral disc displacement, lumbar region 09/14/2016    Lumbar disc herniation    Personal history of other diseases of the circulatory system 04/16/2014    History of hypertension    Personal history of other diseases of the musculoskeletal system and connective tissue 04/16/2014    Personal history of fibromyalgia    SOB (shortness of breath) 11/07/2019    Throat swelling 11/07/2019       Surgical History:   Past Surgical History:   Procedure Laterality Date    BACK SURGERY  09/14/2016    Lower Back Surgery    ELBOW SURGERY  09/14/2016    Elbow Surgery    MR HEAD ANGIO WO IV CONTRAST  1/1/2014    MR HEAD ANGIO WO IV CONTRAST 1/1/2014 [facility]    MR NECK ANGIO WO IV CONTRAST  1/1/2014    MR NECK ANGIO WO IV CONTRAST 1/1/2014 [facility] CL[facility]       Family History:   Family History   Problem Relation Name Age of Onset    Migraines Mother Jose G Gayle     " Other (hypertension, benign) Mother Jose G Gayle     Diabetes type II Mother Jose G Gayle     Diabetes Father Tatyana Edouard     Diabetes type II Father Tatyana Edouard     Atrial fibrillation Maternal Grandfather Bernard Clay     Asthma Maternal Grandmother Audrey Clay     Thyroid disease Maternal Grandmother Audrey Clay        Social History:   Tobacco Use: Low Risk  (4/15/2024)    Patient History     Smoking Tobacco Use: Never     Smokeless Tobacco Use: Never     Passive Exposure: Never             MEDICATIONS:   Current Outpatient Medications   Medication Instructions    albuterol 90 mcg/actuation inhaler inhale 1 to 2 puffs by mouth every 4 to 6 hours as needed    carvedilol (COREG) 12.5 mg, oral, 2 times daily with meals    DULoxetine (Cymbalta) 30 mg DR capsule 1 capsule, oral, Daily    EPINEPHrine 0.3 mg/0.3 mL injection syringe USE AS DIRECTED FOR BEE STING    lisinopril 10 mg, oral, Daily    semaglutide 0.25 mg, subcutaneous, Once Weekly         IMAGING REVIEWED:   Holter monitor full report reviewed     LABS:  CBC:   Lab Results   Component Value Date    WBC 7.6 10/27/2023    RBC 4.89 10/27/2023    HGB 14.3 10/27/2023    HCT 41.9 10/27/2023    MCV 86 10/27/2023    MCH 29.2 10/27/2023    MCHC 34.1 10/27/2023    RDW 12.5 10/27/2023     10/27/2023    MPV 10.3 10/27/2023     CBC with Differential:    Lab Results   Component Value Date    WBC 7.6 10/27/2023    RBC 4.89 10/27/2023    HGB 14.3 10/27/2023    HCT 41.9 10/27/2023     10/27/2023    MCV 86 10/27/2023    MCH 29.2 10/27/2023    MCHC 34.1 10/27/2023    RDW 12.5 10/27/2023    NRBC 0.0 10/27/2023    LYMPHOPCT 24.1 10/27/2023    MONOPCT 7.6 10/27/2023    EOSPCT 2.1 10/27/2023    BASOPCT 0.5 10/27/2023    MONOSABS 0.58 10/27/2023    LYMPHSABS 1.84 10/27/2023    EOSABS 0.16 10/27/2023    BASOSABS 0.04 10/27/2023     CMP:    Lab Results   Component Value Date     10/27/2023    K 4.0 10/27/2023     10/27/2023    CO2 29  "10/27/2023    BUN 13 10/27/2023    CREATININE 0.70 10/27/2023    GLUCOSE 78 10/27/2023    PROT 7.1 10/27/2023    CALCIUM 9.4 10/27/2023    BILITOT 0.7 10/27/2023    ALKPHOS 57 10/27/2023    AST 16 10/27/2023    ALT 19 10/27/2023     BMP:    Lab Results   Component Value Date     10/27/2023    K 4.0 10/27/2023     10/27/2023    CO2 29 10/27/2023    BUN 13 10/27/2023    CREATININE 0.70 10/27/2023    CALCIUM 9.4 10/27/2023    GLUCOSE 78 10/27/2023     Magnesium:No results found for: \"MG\"  Troponin:    Lab Results   Component Value Date    TROPHS <3 10/27/2023     BNP: No results found for: \"BNP\"    Lipid Panel:  Lab Results   Component Value Date    HDL 58.9 10/27/2023    CHHDL 2.9 10/27/2023    VLDL 19 10/27/2023    TRIG 95 10/27/2023    NHDL 112 10/27/2023        Lab work and imaging results independently reviewed by me         Visit Vitals  Ht 1.6 m (5' 3\")   Wt 93.9 kg (207 lb 0.2 oz)   BMI 36.67 kg/m²   Smoking Status Never   BSA 2.04 m²          ECG dated 10/27/2026 independently reviewed   NSR       Constitutional:       Appearance: Healthy appearance. Not in distress.   Eyes:      Conjunctiva/sclera: Conjunctivae normal.   Neck:      Vascular: JVD normal.   Pulmonary:      Effort: Pulmonary effort is normal.      Breath sounds: Normal breath sounds.   Cardiovascular:      PMI at left midclavicular line. Normal rate. Regular rhythm. Normal S1. Normal S2.       Murmurs: There is no murmur.      No rub.   Pulses:     Intact distal pulses.   Edema:     Peripheral edema absent.   Abdominal:      General: Bowel sounds are normal.   Musculoskeletal:      Cervical back: Neck supple. Skin:     General: Skin is warm and dry.   Neurological:      Mental Status: Alert and oriented to person, place and time.               Problem List Items Addressed This Visit             ICD-10-CM    Uncontrolled hypertension I10    Relevant Medications    lisinopril 10 mg tablet    carvedilol (Coreg) 12.5 mg tablet    VINCE " (obstructive sleep apnea) G47.33    Obesity (BMI 30-39.9) - Primary E66.9     Other Visit Diagnoses         Codes    Primary hypertension     I10    Relevant Medications    carvedilol (Coreg) 12.5 mg tablet              HTN with intolerance to several agents (hydrochlorothiazide, amlodipine, losartan)      Increase carvedilol to 12.5 mg BID  Increase lisinopril to 10 mg daily   Plan to add spironolactone If renin / aldosterone return abnormal or BP remains difficult to control on optimal doses of current Rx       04/17/24 at 10:35 AM - SANTOSH Dodson        Followup Appts:  Future Appointments   Date Time Provider Department Center   5/21/2024 10:00 AM SANTOSH Patton [id number] [facility]   10/14/2024  3:40 PM SANTOSH Dodson [id number] [facility]

## 2024-04-16 ENCOUNTER — LAB (OUTPATIENT)
Dept: LAB | Facility: LAB | Age: 49
End: 2024-04-16
Payer: COMMERCIAL

## 2024-04-16 DIAGNOSIS — I10 UNCONTROLLED HYPERTENSION: ICD-10-CM

## 2024-04-16 PROCEDURE — 82088 ASSAY OF ALDOSTERONE: CPT

## 2024-04-16 PROCEDURE — 36415 COLL VENOUS BLD VENIPUNCTURE: CPT

## 2024-04-19 LAB
ALDOST SERPL-MCNC: 10.9 NG/DL
ALDOST/RENIN PLAS-RTO: 21.8 RATIO
RENIN PLAS-CCNC: 0.5 NG/ML/HR

## 2024-04-24 DIAGNOSIS — E66.9 OBESITY (BMI 30-39.9): Primary | ICD-10-CM

## 2024-04-24 RX ORDER — SEMAGLUTIDE 0.25 MG/.5ML
0.25 INJECTION, SOLUTION SUBCUTANEOUS
Qty: 2 ML | Refills: 0 | Status: SHIPPED | OUTPATIENT
Start: 2024-04-28 | End: 2024-05-20

## 2024-05-28 DIAGNOSIS — E66.01 CLASS 2 SEVERE OBESITY DUE TO EXCESS CALORIES WITH SERIOUS COMORBIDITY AND BODY MASS INDEX (BMI) OF 36.0 TO 36.9 IN ADULT (MULTI): Primary | ICD-10-CM

## 2024-07-09 DIAGNOSIS — E66.9 OBESITY (BMI 30-39.9): Primary | ICD-10-CM

## 2024-07-09 RX ORDER — SEMAGLUTIDE 1 MG/.5ML
1 INJECTION, SOLUTION SUBCUTANEOUS
Qty: 2 ML | Refills: 1 | Status: SHIPPED | OUTPATIENT
Start: 2024-07-14 | End: 2024-09-02

## 2024-10-14 ENCOUNTER — APPOINTMENT (OUTPATIENT)
Dept: CARDIOLOGY | Facility: CLINIC | Age: 49
End: 2024-10-14
Payer: COMMERCIAL

## 2025-05-05 DIAGNOSIS — I10 UNCONTROLLED HYPERTENSION: ICD-10-CM

## 2025-05-06 RX ORDER — LISINOPRIL 10 MG/1
10 TABLET ORAL DAILY
Qty: 90 TABLET | Refills: 3 | Status: SHIPPED | OUTPATIENT
Start: 2025-05-06